# Patient Record
Sex: FEMALE | Race: WHITE
[De-identification: names, ages, dates, MRNs, and addresses within clinical notes are randomized per-mention and may not be internally consistent; named-entity substitution may affect disease eponyms.]

---

## 2017-01-05 ENCOUNTER — HOSPITAL ENCOUNTER (OUTPATIENT)
Dept: HOSPITAL 75 - REHAB | Age: 37
Discharge: HOME | End: 2017-01-05
Attending: PAIN MEDICINE
Payer: COMMERCIAL

## 2017-01-05 DIAGNOSIS — M25.551: Primary | ICD-10-CM

## 2017-02-09 ENCOUNTER — HOSPITAL ENCOUNTER (OUTPATIENT)
Dept: HOSPITAL 75 - RAD | Age: 37
End: 2017-02-09
Attending: FAMILY MEDICINE
Payer: COMMERCIAL

## 2017-02-09 DIAGNOSIS — N93.9: Primary | ICD-10-CM

## 2017-02-09 PROCEDURE — 76856 US EXAM PELVIC COMPLETE: CPT

## 2017-02-09 PROCEDURE — 76830 TRANSVAGINAL US NON-OB: CPT

## 2017-02-09 NOTE — DIAGNOSTIC IMAGING REPORT
EXAMINATION: Transabdominal and transvaginal pelvic ultrasound.



INDICATION: Abnormal uterine bleeding.



FINDINGS: The uterus is 10.2 x 8.7 x 11.5 cm. The posterior

aspect demonstrates an exophytic mass arising from the posterior

portion of the uterine body measuring 8 x 7.7 x 7.5 cm. There is

suggestion of a broad attachment over the uterus with no thin

pedicle seen.



The ovaries are obscured by bowel gas.



IMPRESSION: An 8 cm exophytic mass from the posterior aspect of

the uterine body suggestive of a subserosal fibroid.



Dictated by:



Dictated on workstation # [id number]

## 2017-03-10 ENCOUNTER — HOSPITAL ENCOUNTER (OUTPATIENT)
Dept: HOSPITAL 75 - PREOP | Age: 37
Discharge: HOME | End: 2017-03-10
Attending: HOSPITALIST
Payer: COMMERCIAL

## 2017-03-10 VITALS — DIASTOLIC BLOOD PRESSURE: 91 MMHG | SYSTOLIC BLOOD PRESSURE: 136 MMHG

## 2017-03-10 VITALS — BODY MASS INDEX: 47.09 KG/M2 | WEIGHT: 293 LBS | HEIGHT: 66 IN

## 2017-03-10 DIAGNOSIS — Z11.2: ICD-10-CM

## 2017-03-10 DIAGNOSIS — Z01.818: Primary | ICD-10-CM

## 2017-03-10 DIAGNOSIS — D52.9: ICD-10-CM

## 2017-03-10 DIAGNOSIS — E66.9: ICD-10-CM

## 2017-03-10 PROCEDURE — 87081 CULTURE SCREEN ONLY: CPT

## 2017-03-10 NOTE — XMS REPORT
Continuity of Care Document

 Created on: 03/10/2017



FADY GARCIA

External Reference #: [id number]

: 1980

Sex: Female



Demographics







 Address  [address] [place]  [address]

 

 Home Phone  [phone]

 

 Preferred Language  Unknown

 

 Marital Status  Unknown

 

 Faith Affiliation  Unknown

 

 Race  Unknown

 

 Ethnic Group  Unknown





Author







 Author  [facility]

 

 Organization  [facility]

 

 Address  Unknown

 

 Phone  Unavailable



                                                      



Allergies

                                                                               
         



Medications

                                                                               
         



Problems

                      





 Date Dx Coded                    Attending                    Type            
        Code                    Diagnosis                    Diagnosed By      
          

 

 1343                    WILLIAM LOPES MD                    Ot        
            M25.551                    PAIN IN RIGHT HIP                       
              

 

 2016                    ORENDDYLON DO FORTUNATO S                    Ot  
                  G47.33                    OBSTRUCTIVE SLEEP APNEA (ADULT) (
PEDIATR                                     

 

 2016                    ORENDER DO, FORTUNATO S                    Ot  
                  621.2                    HYPERTROPHY OF UTERUS               
                      

 

 2016                    ORENDER DO, FORTUNATO S                    Ot  
                  M25.551                    PAIN IN RIGHT HIP                 
                    

 

 2016                    ORENDER DO, FORTUNATO S                    Ot  
                  M54.5                    LOW BACK PAIN                       
              

 

 2016                    ORENDER DO, FORTUNATO S                    Ot  
                  M25.551                    PAIN IN RIGHT HIP                 
                    

 

 2016                    ORENDER DO, FORTUNATO S                    Ot  
                  M54.5                    LOW BACK PAIN                       
              

 

 2016                    ORENDER DO, FORTUNATO S                    Ot  
                  M25.551                    PAIN IN RIGHT HIP                 
                    

 

 2016                    ORENDER DO, FORTUNATO S                    Ot  
                  M54.5                    LOW BACK PAIN                       
              

 

 2016                    WILLIAM LOPES MD                    Ot        
            M53.3                    SACROCOCCYGEAL DISORDERS, NOT ELSEWHERE   
                                   

 

 2017                    WILLIAM LOPES MD                    Ot        
            M25.551                    PAIN IN RIGHT HIP                       
              

 

 2017                    CHIP DO, FORTUNATO S                    Ot  
                  N93.9                    ABNORMAL UTERINE AND VAGINAL BLEEDING
, U                                     

 

 2017                    MANUELITONDDYLON VALENCIA, FORTUNATO S                    Ot  
                  N93.9                    ABNORMAL UTERINE AND VAGINAL BLEEDING
, U                                     



                                                                               
                                                                               
                                                  



Procedures

                                                                               
         



Results

                                                                    



Encounters

                      





 ACCT No.                    Visit Date/Time                    Discharge      
              Status                    Pt. Type                    Provider   
                 Facility                    Loc./Unit                    
Complaint                

 

 [id number]                    2017 13:01:00                    2017 13:44:00                    DIS                    Outpatient             
       WILLIAM LOPES MD                    [facility]  
                  REHAB                    RIGHT HIP PAIN                

 

 [id number]                    2016 12:57:00                    2016 13:52:00                    DIS                    Outpatient             
       MOSES MCGUIRE, WILLIAM URIARTE                    [facility]  
                  CARD                    M53.3                

 

 [id number]                    2016 20:55:00                    2016 06:50:00                    DIS                    Outpatient             
       FORTUNATO SAUNDERS DO                    [facility]                    SLEEP                    NAS,SNORING,INSOMNIA     
           

 

 [id number]                    2014 11:54:00                    2014 23:59:59                    CLS                    Outpatient             
       FORTUNATO SAUNDERS DO                    [facility]                    RAD                    UTERINE ENLARGEMENT        
        

 

 [id number]                    2017 11:39:00                           
              ACT                    Outpatient                    FORTUNATO SAUNDERS DO                    [facility]               
     RAD                    AUB                

 

 [id number]                    2016 14:45:00                           
              ACT                    Outpatient                    FORTUNATO SAUNDERS DO                    [facility]               
     RAD                    R HIP PAIN,LOW BACK PAIN

## 2017-03-13 ENCOUNTER — HOSPITAL ENCOUNTER (OUTPATIENT)
Dept: HOSPITAL 75 - SDC | Age: 37
Discharge: HOME | End: 2017-03-13
Attending: HOSPITALIST
Payer: COMMERCIAL

## 2017-03-13 VITALS — DIASTOLIC BLOOD PRESSURE: 87 MMHG | SYSTOLIC BLOOD PRESSURE: 123 MMHG

## 2017-03-13 VITALS — WEIGHT: 293 LBS | HEIGHT: 66 IN | BODY MASS INDEX: 47.09 KG/M2

## 2017-03-13 VITALS — DIASTOLIC BLOOD PRESSURE: 86 MMHG | SYSTOLIC BLOOD PRESSURE: 126 MMHG

## 2017-03-13 VITALS — DIASTOLIC BLOOD PRESSURE: 67 MMHG | SYSTOLIC BLOOD PRESSURE: 124 MMHG

## 2017-03-13 VITALS — SYSTOLIC BLOOD PRESSURE: 132 MMHG | DIASTOLIC BLOOD PRESSURE: 77 MMHG

## 2017-03-13 DIAGNOSIS — E66.9: ICD-10-CM

## 2017-03-13 DIAGNOSIS — D25.9: Primary | ICD-10-CM

## 2017-03-13 PROCEDURE — 84703 CHORIONIC GONADOTROPIN ASSAY: CPT

## 2017-03-13 PROCEDURE — 88305 TISSUE EXAM BY PATHOLOGIST: CPT

## 2017-03-13 NOTE — DISCHARGE INST-WOMEN'S SERVICE
Discharge Inst-Women's Serv


Depart Medication/Instructions


New, Converted or Re-Newed RX:  RX on Chart


Final Diagnosis


Abnormal uterine bleeding, obesity, uterine fibroid





Consults/Follow Up


Additional Follow Up:  Yes


Orders/Referrals


Follow-up with Dr. Louise after 4/3/17





Activity


Activity:  Activity as Tolerated


Driving Instructions:  No Driving for 24 Hours (or while taking narcotic pain 

medications)


NO SMOKING:  NO SMOKING


Nothing Inside Vagina:  No Douching, No [place], No Tampons


Other Activity


Nothing per vagina for one week





Diet


Discharge Diet:  No Restrictions


Symptoms to Report to :  Bleeding Excessive, Pain Increased, Fever Over 101 

Degrees F, Pain/Pressure in Chest, Vaginal Bleeding Increase, Dizziness/Fainting

, Nausea/Vomiting, Shortness of Breath


For Any Problems or Questions:  Contact Your Physician, Go to Emergency Room








NENA LOUISE MD Mar 13, 2017 09:43

## 2017-03-13 NOTE — PROGRESS NOTE-STANDARD
Standard Progress Note


Progress Notes/Assess & Plan


Date Seen


3/13/17


Assess & Plan/Chief Complaint


The following H&P was written 3/10/17.


No changes are noted.


         


Gynecology Visit *


 


Patient:   Elina Griffith            MRN: [id number]            FIN: [id number]        

     


Age:   36 years     Sex:  Female     :  1980 


Associated Diagnoses:   None 


Author:   Aspen MCGUIRE, Akanksha 


 


Visit Information 


Visit type:  New patient evaluation.  


Accompanied by:  No one.  


Source of history:  Self.  


Referral source:  Self.  


History limitation:  None.  


 


Chief Complaint 


3/10/2017 9:52 AM CST    New GYN - here for abnormal periods and heavy bleeding 

- periods are heavy with abnormal intervals and irregular length of time - 

minimal amount of pain   


 


History of Present Illness 


37 y/o  s/p BTL here for irregular bleeding/fibroids


Reports that in 2016, she had her "normal" cycle (monthly, lasting 3-5 

days, not heavy).  She then had no period in Sept.  In October, she had more of 

a "normal" 5 day period.  In November, no cycle.  In December, she started on  and bled until .  She reports that she had several days with soaking 

through pads in less than one hour and large clots.


She reports seeing Dr. Koch for this and having an ultrasound and then being 

told to see a gynecologist.


She does have migraine with "stroke-like"symptoms (started after her third 

delivery.


Reports 25lb wt gain since last .  States she has gained 10-20lb per year 

for the last several years but this has not affected her cycles before that she 

is aware of.


Has recently been diagnosed with NAS.


Does have hip pain, has seen Dr. Driscoll for this.


Takes no medications.





OBHx:   CD x 4 (first for chorio/arrest of labor), babies 8-9lb


GYNHx:  Menses irregular as above since 2016.  No STIs.  2 lifetime 

partners.  No abnormal pap smears, last 3-4 years ago. 


 


Review of Systems 


 


 


Reviewed intake form from 3/10/17 


 


Health Status 


Allergies:  


Allergic Reactions (Selected)


No Known Medication Allergies 


Problem list:  


All Problems (Selected)


Seasonal allergies / SNOMED CT [id number] / Confirmed


Morbid obesity / SNOMED CT 070648429 / Probable


Sacrococcygeal disorders, not elsewhere classified / SNOMED CT 4427319689 / 

Confirmed


Anemia / SNOMED CT N87248K0-O6NR-74V5-R259-00636FQ664N1 / Confirmed 


 


Histories 


Past Medical History:  


Active


Seasonal allergies ([id number])


Anemia (N48674C1-J3ND-28L5-T459-87063FY466G5) 


Family History:  


Lupus


Mother


High blood pressure


Father


Cancer


Father


Grandmother (P)


Grandfather (P)


Seasonal Allergies


Father


Sister


Brother


 


Procedure history:  


Injection of sacroiliac joint using fluoroscopic guidance (5000344833) on 2016 at 36 Years.


CS -  section (9072169063) on 2009 at 28 Years.


Tubal ligation (979949748) on 2009 at 28 Years.


CS -  section (5967523368) on 2006 at 26 Years.


CS -  section (8806891557) on 2005 at 24 Years.


CS -  section (3344446111) on 2002 at 21 Years. 


Social History:  


      Alcohol Assessment


            1-2 times per month


      Tobacco Assessment


            Never smoker


      Substance Abuse Assessment: Denies Substance Abuse


      Sexual Assessment


            Sexually active: Yes.  Number of lifetime partners: 2.  Sexual 

orientation: Heterosexual.


 


 


Physical Examination 


Last Menstrual Period: 2016   


Vital Signs 











3/10/2017 9:52 AM CST Systolic Blood Pressure 132 mmHg


 


 Diastolic Blood Pressure 80 mmHg


 


 Mean Arterial Pressure 97 mmHg








Measurements from flowsheet : Measurements 











3/10/2017 9:52 AM CST Height Measured - Standard 66 in


 


 Weight Measured - Standard 385 lb


 


 BSA 2.85 m2


 


 Body Mass Index 62.13 kg/m2








General:  Alert and oriented, No acute distress.  


Gynecology:   


     Labia: Within normal limits.  


     Vagina: Within normal limits, No bleeding.  


     Cervix: not able to visualize despite multiple attempts/different 

speculums.  


     Uterus: enlarged but limited exam.  


Psychiatric:  Cooperative, Appropriate mood & affect.  


 


Review / Management 


Radiology results 


sono 17


uterus 10 x 9 x 12 cm


posterior aspect - exophytic mass (likely subserosal fibroid per read) 8 x 8 x 

8 cm


ovaries not visualized 


 


Impression and Plan 


Plan:  37 y/o  here for heavy menstrual bleeding/AUB.


Uterine fibroid


Class III obesity (BMI 62)


H/o migraine with aura


S/p BTL





I reviewed with Elina that her bleeding may be related to the fibroid but we 

need to r/o other causes.  Will obtain TSH, HCG, PRL, CBC.  I do believe her 

obesity plays a role in the abnormal bleeding and we discussed this.  She 

reports trying dietary changes and I encouraged her to discuss other therapies 

with Dr. Koch.  





I was unable to visualize the cervix today.  I discussed that we will need to 

proceed with hysteroscopy, D&C to evaluate the endometrium and r/o hyperplasia/

malignancy.





Will discuss further therapy once we have all the results..  


Orders   


Orders 


Charges (Evaluation and Management):


84664 office outpatient new 30 minutes (Charge) (Order): Quantity: 1, Other 

specified abnormal uterine and vaginal bleeding | Obesity due to excess 

calories | Migraine with aura.   





Signature Line








Signed and Authored by Akanksha Louise MD on 03/10/2017 11:16 AM CST





Charted Date:   March 10, 2017 11:05 AM CST


Subject / Title:   Gynecology Visit *


Performed By:   Akanksha Louise MD on March 10, 2017 11:16 AM CST


Electronically Signed By:   Akanksah Louise MD on March 10, 2017 11:16 AM CST


Visit Information:   [id number], [facility], Outpatient, 3/10/2017 

- 3/12/2017








AKANKSHA LOUISE MD Mar 13, 2017 09:31

## 2017-03-13 NOTE — OB/GYN OPERATIVE REPORT
Operative Report


Date of Procedure: March 13, 2017





Preoperative Diagnosis: Abnormal uterine bleeding, class III obesity, uterine 

fibroids





Postoperative Diagnosis: Same





Procedure: Attempted hysteroscopy, dilatation and curettage





Surgeon: Nena Louise MD





Anesthesia: General





Estimated Blood Loss:  Minimal





Specimens:  Endometrial curettings to pathology





Indications for Procedure: This is a 37 y/o female with abnormal uterine 

bleeding, class III obesity (BMI 62) and uterine fibroid. Endometrial biopsy 

was attempted in the office however not possible due habitus and patient 

tolerance. She was counseled on proceeding to the OR for definitive diagnosis.





Findings:  Uterus sounded to 10cm.  Unable to complete hysteroscopy due to 

location of the cervix/position of the uterus.  





Procedure: The patient was taken to the operating room where sequential 

compression devices were placed on the bilateral lower extremities. Intravenous 

fluids were running. General anesthesia was obtained without difficulty. She 

was repositioned in the dorsal lithotomy position with the use of Yellofin 

stirrups. She was prepped and draped in the typical sterile fashion.





The bladder was emptied with a straight catheterization yielding 400 cc of 

clear yellow urine. A weighted speculum was placed in the vagina. A right angle 

was utilized to elevate the vaginal tissue anteriorly. A single tooth tenaculum 

was placed on the anterior lip of the cervix with moderate difficulty due to 

the location of the cerivx. The uterus was sounded to 10cm, again with 

difficulty. Delatorre dilators were used to dilate the cervix to 17mm. The Truclear 

5 mm hysteroscope was attempted to be advanced into the endometrial cavity.  

However, despite multiple attempts at dilating and repositioning the cervix 

with a single tooth tenaculum on the posterior lip of the cervix, I could not 

advance the hysteroscope into the cavity.  It was felt that the chance of 

perforation was quite high, and my first goal was to obtain a tissue sample for 

pathology.  Thus, after approximately 15-20 minutes of attempting to enter the 

cavity with the hysteroscope, I stopped that portion of the procedure.  

Unfortunately there is no flexible hysteroscope available.  I was able to 

advance a small sized curette into the cavity. A sharp circumferential 

curettage was performed with a small sharp curette; this was sent to pathology. 

All instruments were removed from the vagina; the anterior and posterior lips 

of the cervix were hemostatic after tenaculum removal.





The patient tolerated the procedure well. Instrument counts were correct. The 

patient was taken to recovery in stable condition.





Complications: None 





Disposition: Home, stable








NENA LOUISE MD Mar 13, 2017 09:45

## 2017-03-13 NOTE — XMS REPORT
Continuity of Care Document

 Created on: 03/10/2017



FADY GRIFFITH

External Reference #: [id number]

: 1980

Sex: Female



Demographics







 Address  [address] [place]  [address]

 

 Home Phone  [phone]

 

 Preferred Language  English

 

 Marital Status  Unknown

 

 Jainism Affiliation  Unknown

 

 Race  Unknown

 

 Ethnic Group  Unknown





Author







 Author  [facility]

 

 Organization  [facility]

 

 Address  Unknown

 

 Phone  Unavailable







Support







 Name  Relationship  Address  Phone

 

 NENA COLLINS MD  Caregiver  [address]

 [address] [place]  [address] [phone]

 

 FORTUNATO SAUNDERS DO  Caregiver  [address]

[place]  [address] [phone]

 

 SADIE GRIFFITH  Next Of Kin  [address] [place]  [address] [phone]







Care Team Providers







 Care Team Member Name  Role  Phone

 

 FORTUNATO SAUNDERS DO  PCP  [phone]







Insurance Providers







 Payer Name  Policy Number  Subscriber Name  Relationship

 

 [facility]  [id number]  Sadie Griffith  01 







Advance Directives







 Directive  Response  Recorded Date/Time

 

 Advance Directives  No  03/10/17 11:14am

 

 Health Care Power of   No  03/10/17 11:14am

 

 Organ Donor  No  03/10/17 11:14am

 

 Resuscitation Status  Full Code  03/10/17 11:14am







Problems

No problem information available.



Medications

No known medications.



Social History







 Social History Problem  Response  Recorded Date/Time

 

 Alcohol Use  Occasionally Uses  03/10/2017 11:14am

 

 Recreational Drug Use  No  03/10/2017 11:14am

 

 Recent Foreign Travel  No  03/10/2017 11:11am

 

 Recent Infectious Disease Exposure  No  03/10/2017 11:11am

 

 Smoking Status  Never a Smoker  03/10/2017 11:14am

 

 Recent Hopitalizations  No  03/10/2017 11:14am









 Query  Response  Start Date  Stop Date

 

 Smoking Status  Never a Smoker      







Hospital Discharge Instructions

No hospital discharge instructions.



Plan of Care







 Discharge Date  03/10/17 11:22am

 

 Prescriptions  See Medication Section







Functional Status

No functional status results.



Allergies, Adverse Reactions, Alerts

No known allergies.



Immunizations

No immunization records.



Vital Signs

Acute Vital Signs





 Vital  Response  Date/Time

 

 Pulse Rate (adult)  75 bpm (60 - 90)  03/10/2017 11:14am

 

 O2 Sat by Pulse Oximetry  97 % (88 - 100)  03/10/2017 11:14am

 

 Blood Pressure  136/91 mm Hg  03/10/2017 11:14am

 

    Blood Pressure Mean  106 mm Hg  03/10/2017 11:14am

 

 Pain      

 

    Numeric Pain Scale  0-No Pain  03/10/2017 11:14am

 

 Height (Feet)  5 feet  03/10/2017 11:11am

 

 Height (Inches)  6.00 inches  03/10/2017 11:11am

 

 Height (Calculated Centimeters)  167.026198 cm  03/10/2017 11:11am

 

 Weight (Pounds)  382 pounds  03/10/2017 11:11am

 

 Weight (Ounces)  0.0 oz  03/10/2017 11:11am

 

 Weight (Calculated Grams)  857651.29 gm  03/10/2017 11:11am

 

 Weight (Calculated Kilograms)  173.506317 kilograms  03/10/2017 11:11am

 

 Calculated BMI  61.7  03/10/2017 11:11am







Results

No known relevant diagnostic tests, laboratory data and/or discharge summary.



Procedures

No known history of procedures.



Encounters







 Encounter  Location  Arrival/Admit Date  Discharge/Depart Date  Attending 
Provider

 

 Departed Clinic  [facility]  03/10/17 11:04am  03/10/17 11:
22am  NENA COLLINS MD

 

 Registered Clinic  [facility]  17 11:39am     FORTUNATO SAUNDERS DO

## 2017-03-13 NOTE — XMS REPORT
Continuity of Care Document

 Created on: 03/10/2017



FADY GRIFFITH

External Reference #: [id number]

: 1980

Sex: Female



Demographics







 Address  [address] [place]  [address]

 

 Home Phone  [phone]

 

 Preferred Language  English

 

 Marital Status  Unknown

 

 Druze Affiliation  Unknown

 

 Race  Unknown

 

 Ethnic Group  Unknown





Author







 Author  [facility]

 

 Organization  [facility]

 

 Address  Unknown

 

 Phone  Unavailable







Support







 Name  Relationship  Address  Phone

 

 NENA COLLINS MD  Caregiver  [address]

 [address] [place]  [address] [phone]

 

 FORTUNATO SAUNDERS DO  Caregiver  [address]

[place]  [address] [phone]

 

 SADIE GRIFFITH  Next Of Kin  [address] [place]  [address] [phone]







Care Team Providers







 Care Team Member Name  Role  Phone

 

 FORTUNATO SAUNDERS DO  PCP  [phone]







Insurance Providers







 Payer Name  Policy Number  Subscriber Name  Relationship

 

 [facility]  [id number]  Sadie Griffith  01 







Advance Directives







 Directive  Response  Recorded Date/Time

 

 Advance Directives  No  03/10/17 11:14am

 

 Health Care Power of   No  03/10/17 11:14am

 

 Organ Donor  No  03/10/17 11:14am

 

 Resuscitation Status  Full Code  03/10/17 11:14am







Problems

No problem information available.



Medications

No known medications.



Social History







 Social History Problem  Response  Recorded Date/Time

 

 Alcohol Use  Occasionally Uses  03/10/2017 11:14am

 

 Recreational Drug Use  No  03/10/2017 11:14am

 

 Recent Foreign Travel  No  03/10/2017 11:11am

 

 Recent Infectious Disease Exposure  No  03/10/2017 11:11am

 

 Smoking Status  Never a Smoker  03/10/2017 11:14am

 

 Recent Hopitalizations  No  03/10/2017 11:14am









 Query  Response  Start Date  Stop Date

 

 Smoking Status  Never a Smoker      







Hospital Discharge Instructions

No hospital discharge instructions.



Plan of Care







 Discharge Date  03/10/17 11:22am

 

 Prescriptions  See Medication Section







Functional Status

No functional status results.



Allergies, Adverse Reactions, Alerts

No known allergies.



Immunizations

No immunization records.



Vital Signs

Acute Vital Signs





 Vital  Response  Date/Time

 

 Pulse Rate (adult)  75 bpm (60 - 90)  03/10/2017 11:14am

 

 O2 Sat by Pulse Oximetry  97 % (88 - 100)  03/10/2017 11:14am

 

 Blood Pressure  136/91 mm Hg  03/10/2017 11:14am

 

    Blood Pressure Mean  106 mm Hg  03/10/2017 11:14am

 

 Pain      

 

    Numeric Pain Scale  0-No Pain  03/10/2017 11:14am

 

 Height (Feet)  5 feet  03/10/2017 11:11am

 

 Height (Inches)  6.00 inches  03/10/2017 11:11am

 

 Height (Calculated Centimeters)  167.840931 cm  03/10/2017 11:11am

 

 Weight (Pounds)  382 pounds  03/10/2017 11:11am

 

 Weight (Ounces)  0.0 oz  03/10/2017 11:11am

 

 Weight (Calculated Grams)  527457.29 gm  03/10/2017 11:11am

 

 Weight (Calculated Kilograms)  173.530484 kilograms  03/10/2017 11:11am

 

 Calculated BMI  61.7  03/10/2017 11:11am







Results

No known relevant diagnostic tests, laboratory data and/or discharge summary.



Procedures

No known history of procedures.



Encounters







 Encounter  Location  Arrival/Admit Date  Discharge/Depart Date  Attending 
Provider

 

 Departed Clinic  [facility]  03/10/17 11:04am  03/10/17 11:
22am  NENA COLLINS MD

 

 Registered Clinic  [facility]  17 11:39am     FORTUNATO SAUNDERS DO

## 2017-03-31 ENCOUNTER — HOSPITAL ENCOUNTER (OUTPATIENT)
Dept: HOSPITAL 75 - CARD | Age: 37
Discharge: HOME | End: 2017-03-31
Attending: PAIN MEDICINE
Payer: COMMERCIAL

## 2017-03-31 VITALS — DIASTOLIC BLOOD PRESSURE: 96 MMHG | SYSTOLIC BLOOD PRESSURE: 162 MMHG

## 2017-03-31 VITALS — SYSTOLIC BLOOD PRESSURE: 146 MMHG | DIASTOLIC BLOOD PRESSURE: 93 MMHG

## 2017-03-31 VITALS — BODY MASS INDEX: 47.09 KG/M2 | HEIGHT: 66 IN | WEIGHT: 293 LBS

## 2017-03-31 DIAGNOSIS — M53.3: Primary | ICD-10-CM

## 2017-03-31 PROCEDURE — 27096 INJECT SACROILIAC JOINT: CPT

## 2017-04-08 ENCOUNTER — HOSPITAL ENCOUNTER (EMERGENCY)
Dept: HOSPITAL 75 - ER | Age: 37
Discharge: HOME | End: 2017-04-08
Payer: COMMERCIAL

## 2017-04-08 VITALS — SYSTOLIC BLOOD PRESSURE: 146 MMHG | DIASTOLIC BLOOD PRESSURE: 98 MMHG

## 2017-04-08 VITALS — BODY MASS INDEX: 47.09 KG/M2 | HEIGHT: 66 IN | WEIGHT: 293 LBS

## 2017-04-08 DIAGNOSIS — N39.0: Primary | ICD-10-CM

## 2017-04-08 LAB
BILIRUB UR QL STRIP: (no result)
KETONES UR QL STRIP: NEGATIVE
LEUKOCYTE ESTERASE UR QL STRIP: NEGATIVE
NITRITE UR QL STRIP: POSITIVE
PH UR STRIP: 5 [PH] (ref 5–9)
PROT UR QL STRIP: (no result)
SP GR UR STRIP: 1.02 (ref 1.02–1.02)
SQUAMOUS #/AREA URNS HPF: (no result) /HPF
UROBILINOGEN UR-MCNC: 8 MG/DL
WBC #/AREA URNS HPF: (no result) /HPF

## 2017-04-08 PROCEDURE — 87088 URINE BACTERIA CULTURE: CPT

## 2017-04-08 PROCEDURE — 96372 THER/PROPH/DIAG INJ SC/IM: CPT

## 2017-04-08 PROCEDURE — 81000 URINALYSIS NONAUTO W/SCOPE: CPT

## 2017-04-08 PROCEDURE — 99282 EMERGENCY DEPT VISIT SF MDM: CPT

## 2017-04-08 PROCEDURE — 84703 CHORIONIC GONADOTROPIN ASSAY: CPT

## 2017-04-08 NOTE — ED GU-FEMALE
General


Chief Complaint:  -Female


Stated Complaint:  UTI


Nursing Triage Note:  


PT CO OF UTI SX, HAS BEEN ON ANITBIOTIC SINCE THURSDAY, CIPRO, AZO. PT STATES 


HAS CONT SX, PAIN , BURNING AND FREQUENCY AND L FLANK PAIN


Nursing Sepsis Screen:  No Definite Risk


Source:  patient


Exam Limitations:  no limitations





History of Present Illness


Time seen by provider:  11:16


Initial Comments


36-year-old female patient presents to the emergency department complaining of 

dysuria, frequency, left flank pain.  Reports pain radiates into the left back.

  Patient states she was started on ciprofloxacin and Azo on Thursday, but 

states symptoms have progressively gotten worse.


Timing/Duration:  other (Wednesday)


Severity/Quality:  burning


Location:  suprapubic


Radiation:  back (lt), left flank, urethral


Activities at Onset:  none


Prior Genitourinary Problems:  similar symptoms


Sexual [place] History:  less than 2 months ago, single partner


Modifying Factors:  Worsens With Palpation, Worsens With Urinating





Allergies and Home Medications


Allergies


Coded Allergies:  


     No Known Drug Allergies (Unverified , 3/10/17)





Home Medications


Cefdinir 300 Mg Capsule, 300 MG PO BID, #14 Ref 0


   Prescribed by: ROSE DIETRICH on 17 1150


Hydrocodone/Acetaminophen 1 Each Tablet, 1 EACH PO Q4H PRN for PAIN, #10


   Prescribed by: NENA COLLINS on 3/13/17 0944


Hydrocodone/Acetaminophen 1 Each Tablet, 1 EACH PO Q4H PRN for PAIN, #14 Ref 0


   Prescribed by: ROSE DIETRICH on 17 1150


Ibuprofen 600 Mg Tablet, 600 MG PO Q6H, #30


   Prescribed by: NENA COLLINS on 3/13/17 0944





Constitutional:  No chills, No fever, malaise


Respiratory:  no symptoms reported


Cardiovascular:  no symptoms reported


Gastrointestinal:  abdominal pain, No constipation, No diarrhea, No nausea, No 

vomiting


Genitourinary:  see HPI, burning, denies discharge, dysuria, frequency, flank 

pain, denies hematuria, pain, urgency


Musculoskeletal:  see HPI, back pain


Skin:  no symptoms reported


Psychiatric/Neurological:  No Symptoms Reported


All Other Systemes Reviewed


Negative Unless Noted:  Yes (Negative excepted noted.)





Past Medical-Social-Family Hx


Patient Social History


Alcohol Use:  Denies Use


Recreational Drug Use:  No


Smoking Status:  Never a Smoker


Recent Foreign Travel:  No


Contact w/Someone Who Travel:  No


Recent Infectious Disease Expo:  No


Recent Hopitalizations:  No





Immunizations Up To Date


Tetanus Booster (TDap):  Unknown





Seasonal Allergies


Seasonal Allergies:  Yes





Surgeries


HX Surgeries:  Yes (C/S x4, WISDOM TEETH)


Surgeries:   Section





Respiratory


Hx Respiratory Disorders:  Yes


Respiratory Disorders:  Sleep Apnea





Cardiovascular


Hx Cardiac Disorders:  No





Neurological


Hx Neurological Disorders:  Yes


Neurological Disorders:  Headaches /Migraines





Reproductive System


Hx Reproductive Disorders:  Yes (AUB)





Genitourinary


Hx Genitourinary Disorders:  No





Gastrointestinal


Hx Gastrointestinal Disorders:  No





Musculoskeletal


Hx Musculoskeletal Disorders:  Yes (HIP JOINT PAIN)





Endocrine


Hx Endocrine Disorders:  No





HEENT


HX ENT Disorders:  No


Loss of Vision:  Denies


Hearing Impairment:  Denies





Cancer


Hx Cancer:  No





Psychosocial


Hx Psychiatric Problems:  No





Integumentary


HX Skin/Integumentary Disorder:  No





Blood Transfusions


Hx Blood Disorders:  Yes (MILD ANEMIA)





Reviewed Nursing Assessment


Reviewed/Agree w Nursing PMH:  Yes





Family Medical History


Significant Family History:  No Pertinent Family Hx





Physical Exam


Vital Signs





Vital Sign - Last 12Hours








 17





 10:20


 


Temp 97.3


 


Pulse 77


 


Resp 18


 


B/P (MAP) 152/104


 


Pulse Ox 93





Capillary Refill : Less Than 3 Seconds


General Appearance:  WD/WN, no apparent distress


Cardiovascular:  regular rate, rhythm, no murmur


Respiratory:  lungs clear, normal breath sounds, no respiratory distress


Gastrointestinal:  normal bowel sounds, soft, No distended, guarding (suprapubic

, LLQ, and left flank), No rebound, tenderness (generalized abdominal and 

bilateral flank tenderness.)


Back:  normal inspection, No CVA tenderness (R), CVA tenderness (L)


Extremities:  normal inspection, normal capillary refill


Neurologic/Psychiatric:  alert, normal mood/affect, oriented x 3


Skin:  normal color, warm/dry





Progress/Results/Core Measures


Results/Orders


Lab Results





Laboratory Tests








Test


  17


10:40 Range/Units


 


 


Urine Color ANGELA H  


 


Urine Clarity


  SLIGHTLY


CLOUDY  


 


 


Urine pH 5  5-9  


 


Urine Specific Gravity 1.020  1.016-1.022  


 


Urine Protein 2+ H NEGATIVE  


 


Urine Glucose (UA) NEGATIVE  NEGATIVE  


 


Urine Ketones NEGATIVE  NEGATIVE  


 


Urine Nitrite POSITIVE H NEGATIVE  


 


Urine Bilirubin 3+ H NEGATIVE  


 


Urine Urobilinogen 8 H NORMAL  MG/DL


 


Urine Leukocyte Esterase NEGATIVE  NEGATIVE  


 


Urine RBC (Auto) 3+ H NEGATIVE  


 


Urine RBC NONE   /HPF


 


Urine WBC 0-2   /HPF


 


Urine Squamous Epithelial


Cells 2-5 


   /HPF


 


 


Urine Crystals NONE   /LPF


 


Urine Bacteria FEW H  /HPF


 


Urine Casts NONE   /LPF


 


Urine Mucus NEGATIVE   /LPF


 


Urine Culture Indicated NO   








My Orders





Orders - ROSE DIETRICH


Ceftriaxone Injection (Rocephin Injectio (17 11:30)


Lidocaine 1% Injection (Xylocaine 1% Inj (17 11:30)





Medications Given in ED





Current Medications








 Medications  Dose


 Ordered  Sig/Vernell


 Route  Start Time


 Stop Time Status Last Admin


Dose Admin


 


 Lidocaine HCl  2.1 ml  ONCE  ONCE


 INJ  17 11:30


 17 11:31 DC 17 11:43


2.1 ML








Vital Signs/I&O





Vital Sign - Last 12Hours








 17





 10:20


 


Temp 97.3


 


Pulse 77


 


Resp 18


 


B/P (MAP) 152/104


 


Pulse Ox 93














Blood Pressure Mean:  120











Point of Care Testing


Urine Pregnancy-Bedside:  Negative





Departure


Communication


Progress Notes


Patient is noted to be nitrite positive in light of having antibiotics for the 

last 2 days.  Unable to obtain outpatient culture results at this time.  Will 

give patient 1 g Rocephin IM as well as changed from ciprofloxacin to Omnicef.  

Discharge to home.





Impression


Impression:  


 Primary Impression:  


 Urinary tract infection


 Qualified Codes:  N30.00 - Acute cystitis without hematuria


Disposition:   HOME, SELF-CARE


Condition:  Improved





Departure-Patient Inst.


Decision time for Depature:  11:49


Referrals:  


FORTUNATO SAUNDERS DO (PCP/Family)


Primary Care Physician


Patient Instructions:  Urinary Tract Infection, Adult (DC)





Add. Discharge Instructions:  


All discharge instructions reviewed with patient and/or family. Voiced 

understanding.  Medications as instructed.  Continue Azo as needed for 

symptoms.  Ibuprofen 800 mg by mouth every 8 hours as needed for pain.  Drink 

plenty of fluids.  Follow-up with Dr. Saunders as an outpatient for recheck.  

Return to the emergency department for worsened symptoms or any other concerns.


Scripts


Hydrocodone/Acetaminophen (Hydrocodon -Acetaminophen 5-325) 1 Each Tablet


1 EACH PO Q4H Y for PAIN, #14 TAB 0 Refills


   Prov: ROSE DIETRICH         17 


Cefdinir (Cefdinir) 300 Mg Capsule


300 MG PO BID, #14 CAP 0 Refills


   Prov: ROSE DIETRICH         17











ROSE DIETRICH 2017 11:16

## 2017-05-14 NOTE — XMS REPORT
Continuity of Care Document

 Created on: 2017



RIKKI GARCIA

External Reference #: [id number]

: 1980

Sex: Female



Demographics







 Address  [address] [place]  [address]

 

 Home Phone  [phone]

 

 Preferred Language  Unknown

 

 Marital Status  Unknown

 

 Gnosticist Affiliation  Unknown

 

 Race  Unknown

 

 Ethnic Group  Unknown





Author







 Author  [facility]

 

 Organization  [facility]

 

 Address  Unknown

 

 Phone  Unavailable



                                                      



Allergies

                      





 Active                    Description                    Code                  
  Type                    Severity                    Reaction                  
  Onset                    Reported/Identified                    Relationship 
to Patient                    Clinical Status                

 

 Yes                    No Known Drug Allergies                    [id number]  
                  Drug Allergy                    Unknown                    N/
A                                         03/10/2017                           
                               



                                                                               
         



Medications

                                                                               
         



Problems

                      





 Date Dx Coded                    Attending                    Type            
        Code                    Diagnosis                    Diagnosed By      
          

 

 1343                    WILLIAM LOPES MD                    Ot        
            M25.551                    PAIN IN RIGHT HIP                       
              

 

 2016                    MANUELITONDER DO, FORTUNATO S                    Ot  
                  G47.33                    OBSTRUCTIVE SLEEP APNEA (ADULT) (
PEDIATR                                     

 

 2016                    MANUELITONDDYLON DO, FORTUNATO S                    Ot  
                  621.2                    HYPERTROPHY OF UTERUS               
                      

 

 2016                    ORENDER DO, FORTUNATO S                    Ot  
                  M25.551                    PAIN IN RIGHT HIP                 
                    

 

 2016                    ORENDER DO, FORTUNATO S                    Ot  
                  M54.5                    LOW BACK PAIN                       
              

 

 2016                    ORENDER DO, FORTUNATO S                    Ot  
                  M25.551                    PAIN IN RIGHT HIP                 
                    

 

 2016                    ORENDER DO, FORTUNATO S                    Ot  
                  M54.5                    LOW BACK PAIN                       
              

 

 2016                    ORENDER DO, FORTUNATO S                    Ot  
                  M25.551                    PAIN IN RIGHT HIP                 
                    

 

 2016                    ORENDER DO, FORTUNATO S                    Ot  
                  M54.5                    LOW BACK PAIN                       
              

 

 2016                    WILLIAM LOPES MD                    Ot        
            M53.3                    SACROCOCCYGEAL DISORDERS, NOT ELSEWHERE   
                                   

 

 2017                    WILLIAM LOPES MD                    Ot        
            M25.551                    PAIN IN RIGHT HIP                       
              

 

 2017                    CHIP VALENCIA, FORTUNATO S                    Ot  
                  N93.9                    ABNORMAL UTERINE AND VAGINAL BLEEDING
, U                                     

 

 2017                    MILI SAUNDERS DOQUELINE S                    Ot  
                  N93.9                    ABNORMAL UTERINE AND VAGINAL BLEEDING
, U                                     

 

 2017                    DENNIS MCGUIRE, NENA BRANDT                    Ot        
            D25.9                    LEIOMYOMA OF UTERUS, UNSPECIFIED          
                           

 

 2017                    DENNIS MCGUIRE, NENA BRANDT                    Ot        
            E66.9                    OBESITY, UNSPECIFIED                      
               

 

 2017                    NENA COLLINS MD                    Ot        
            D25.9                    LEIOMYOMA OF UTERUS, UNSPECIFIED          
                           

 

 2017                    NENA COLLINS MD                    Ot        
            E66.9                    OBESITY, UNSPECIFIED                      
               

 

 2017                    WILLIAM LOPES MD                    Ot        
            M53.3                    SACROCOCCYGEAL DISORDERS, NOT ELSEWHERE   
                                   

 

 2017                    WILLIAM LOPES MD                    Ot        
            M53.3                    SACROCOCCYGEAL DISORDERS, NOT ELSEWHERE   
                                   

 

 2017                    ROSE MONTIEL                    Ot     
               N39.0                    URINARY TRACT INFECTION, SITE NOT 
SPECIF                                     

 

 2017                    TRACIE MONTIELEN REGINA                    Ot     
               R30.0                    DYSURIA                                
     

 

 04/10/2017                    ROSE MONTIEL                    Ot     
               N39.0                    URINARY TRACT INFECTION, SITE NOT 
SPECIF                                     

 

 04/10/2017                    ROSE MONTIEL                    Ot     
               R30.0                    DYSURIA                                
     



                                                                               
                                                                               
                                                                               
                                                                       



Procedures

                                                                               
         



Results

                      





 Test                    Result                    Range                









 Methicillin resistant Staphylococcus aureus (MRSA) screening culture - 03/10/
17 11:21                









 Methicillin resistant Staphylococcus aureus (MRSA) screening culture          
          NEG                     NRG                









 Urine beta human chorionic gonadotropin (hCG) measurement - 17 07:25    
            









 Urine beta human chorionic gonadotropin (hCG) measurement                    
NEGATIVE                     NEGATIVE                









 Complete urinalysis with reflex to culture - 17 10:40                









 Urine color determination                    ANGELA                     NRG    
            

 

 Urine clarity determination                    SLIGHTLY CLOUDY                
     NRG                

 

 Urine pH measurement by test strip                    5                     5-
9                

 

 Specific gravity of urine by test strip                    1.020              
       1.016-1.022                

 

 Urine protein assay by test strip, semi-quantitative                    2+    
                 NEGATIVE                

 

 Urine glucose detection by automated test strip                    NEGATIVE   
                  NEGATIVE                

 

 Erythrocytes detection in urine sediment by light microscopy                  
  3+                     NEGATIVE                

 

 Urine ketones detection by automated test strip                    NEGATIVE   
                  NEGATIVE                

 

 Urine nitrite detection by test strip                    POSITIVE             
        NEGATIVE                

 

 Urine total bilirubin detection by test strip                    3+           
          NEGATIVE                

 

 Urine urobilinogen measurement by automated test strip (mass/volume)          
          8 mg/dL                    NORMAL                

 

 Urine leukocyte esterase detection by dipstick                    NEGATIVE    
                 NEGATIVE                

 

 Automated urine sediment erythrocyte count by microscopy (number/high power 
field)                    NONE                     NRG                

 

 Automated urine sediment leukocyte count by microscopy (number/high power field
)                     [HPF]                    NRG                

 

 Bacteria detection in urine sediment by light microscopy                    
FEW                     NRG                

 

 Squamous epithelial cells detection in urine sediment by light microscopy     
               2-5                     NRG                

 

 Crystals detection in urine sediment by light microscopy                    
NONE                     NRG                

 

 Casts detection in urine sediment by light microscopy                    NONE 
                    NRG                

 

 Mucus detection in urine sediment by light microscopy                    
NEGATIVE                     NRG                

 

 Complete urinalysis with reflex to culture                    NO              
       NRG                









 Bacterial urine culture - 17 10:40                









 Bacterial urine culture                    [id number]                     NRG   
             

 

 COLONY COUNT                    10,000/ML - 100,000/ML                     NRG
                

 

 FTX;REPORTABLE                    UNABLE TO ID MUCOID GRAM NEGATIVE ANDRES       
              NRG                

 

 URINE CULTURE RESULTS                    PLUS                     NRG         
       

 

 FREE TEXT ENTRY 2                    PLEASE NOTIFY MICRO AT X 141 IF  WANTS 
                    NRG                

 

 FREE TEXT ENTRY 3                    THIS ISOLATE SENT TO A REFERENCE LAB.    
                 NRG                



                                                                               
                             



Encounters

                      





 ACCT No.                    Visit Date/Time                    Discharge      
              Status                    Pt. Type                    Provider   
                 Facility                    Loc./Unit                    
Complaint                

 

 [id number]                    2017 10:00:00                    2017 12:10:00                    DIS                    Emergency              
      ROSE MONTIEL                    [facility]
                    ER                    UTI                

 

 [id number]                    2017 10:56:00                    2017 12:09:00                    DIS                    Outpatient             
       WILLIAM LOPES MD                    [facility]  
                  CARD                    SACROCOCCYGEAL DISORDER              
  

 

 [id number]                    2017 08:10:00                    2017 13:39:00                    DIS                    Outpatient             
       NENA COLLINS MD                    [facility]                    AUB;OBESITY,FIBROIDS                

 

 [id number]                    03/10/2017 11:04:00                    03/10/
2017 11:22:00                    DIS                    Outpatient             
       NENA COLLINS MD                    [facility]  
                  PREOP                    AUB;OBESITY,FIBROIDS                

 

 [id number]                    2017 13:01:00                    2017 13:44:00                    DIS                    Outpatient             
       WILLIAM LOPES MD                    [facility]  
                  REHAB                    RIGHT HIP PAIN                

 

 [id number]                    2016 12:57:00                    2016 13:52:00                    DIS                    Outpatient             
       WILLIAM LOPES MD                    [facility]  
                  CARD                    M53.3                

 

 [id number]                    2016 20:55:00                    2016 06:50:00                    DIS                    Outpatient             
       FORTUNATO SAUNDERS DO                    [facility]                    SLEEP                    NAS,SNORING,INSOMNIA     
           

 

 [id number]                    2014 11:54:00                    2014 23:59:59                    CLS                    Outpatient             
       FORTUNATO SAUNDERS DO                    [facility]                    RAD                    UTERINE ENLARGEMENT        
        

 

 [id number]                    2017 11:39:00                           
              ACT                    Outpatient                    FORTUNATO SAUNDERS DO                    [facility]               
     RAD                    AUB                

 

 [id number]                    2016 14:45:00                           
              ACT                    Outpatient                    FORTUNATO SAUNDERS DO                    [facility]               
     RAD                    R HIP PAIN,LOW BACK PAIN

## 2017-05-28 ENCOUNTER — HOSPITAL ENCOUNTER (EMERGENCY)
Dept: HOSPITAL 75 - ER | Age: 37
Discharge: HOME | End: 2017-05-28
Payer: COMMERCIAL

## 2017-05-28 VITALS — BODY MASS INDEX: 47.09 KG/M2 | WEIGHT: 293 LBS | HEIGHT: 66 IN

## 2017-05-28 VITALS — DIASTOLIC BLOOD PRESSURE: 68 MMHG | SYSTOLIC BLOOD PRESSURE: 127 MMHG

## 2017-05-28 DIAGNOSIS — N93.8: ICD-10-CM

## 2017-05-28 DIAGNOSIS — N85.8: Primary | ICD-10-CM

## 2017-05-28 LAB
ALBUMIN SERPL-MCNC: 3.6 G/DL (ref 3.2–4.5)
ALT SERPL-CCNC: 12 U/L (ref 0–55)
ANION GAP SERPL CALC-SCNC: 10 MMOL/L (ref 5–14)
APTT BLD: 30 SEC (ref 24–35)
AST SERPL-CCNC: 11 U/L (ref 5–34)
BASOPHILS # BLD AUTO: 0.1 10^3/UL (ref 0–0.1)
BASOPHILS NFR BLD AUTO: 1 % (ref 0–10)
BILIRUB SERPL-MCNC: 0.4 MG/DL (ref 0.1–1)
BUN SERPL-MCNC: 10 MG/DL (ref 7–18)
BUN/CREAT SERPL: 15
CALCIUM SERPL-MCNC: 8.7 MG/DL (ref 8.5–10.1)
CHLORIDE SERPL-SCNC: 108 MMOL/L (ref 98–107)
CO2 SERPL-SCNC: 22 MMOL/L (ref 21–32)
CREAT SERPL-MCNC: 0.68 MG/DL (ref 0.6–1.3)
EOSINOPHIL # BLD AUTO: 0.2 10^3/UL (ref 0–0.3)
EOSINOPHIL NFR BLD AUTO: 1 % (ref 0–10)
ERYTHROCYTE [DISTWIDTH] IN BLOOD BY AUTOMATED COUNT: 16.3 % (ref 10–14.5)
GFR SERPLBLD BASED ON 1.73 SQ M-ARVRAT: > 60 ML/MIN
GLUCOSE SERPL-MCNC: 109 MG/DL (ref 70–105)
INR PPP: 1.2 (ref 0.8–1.4)
LYMPHOCYTES # BLD AUTO: 2 X 10^3 (ref 1–4)
LYMPHOCYTES NFR BLD AUTO: 16 % (ref 12–44)
MCH RBC QN AUTO: 23 PG (ref 25–34)
MCHC RBC AUTO-ENTMCNC: 31 G/DL (ref 32–36)
MCV RBC AUTO: 75 FL (ref 80–99)
MONOCYTES # BLD AUTO: 0.8 X 10^3 (ref 0–1)
MONOCYTES NFR BLD AUTO: 7 % (ref 0–12)
NEUTROPHILS # BLD AUTO: 8.9 X 10^3 (ref 1.8–7.8)
NEUTROPHILS NFR BLD AUTO: 75 % (ref 42–75)
PLATELET # BLD: 299 10^3/UL (ref 130–400)
PMV BLD AUTO: 11.3 FL (ref 7.4–10.4)
POTASSIUM SERPL-SCNC: 3.6 MMOL/L (ref 3.6–5)
PROT SERPL-MCNC: 6.7 G/DL (ref 6.4–8.2)
PROTHROMBIN TIME: 14.7 SEC (ref 12.2–14.7)
RBC # BLD AUTO: 4.26 10^6/UL (ref 4.35–5.85)
SODIUM SERPL-SCNC: 140 MMOL/L (ref 135–145)
WBC # BLD AUTO: 11.9 10^3/UL (ref 4.3–11)

## 2017-05-28 PROCEDURE — 84443 ASSAY THYROID STIM HORMONE: CPT

## 2017-05-28 PROCEDURE — 36415 COLL VENOUS BLD VENIPUNCTURE: CPT

## 2017-05-28 PROCEDURE — 80053 COMPREHEN METABOLIC PANEL: CPT

## 2017-05-28 PROCEDURE — 84703 CHORIONIC GONADOTROPIN ASSAY: CPT

## 2017-05-28 PROCEDURE — 85730 THROMBOPLASTIN TIME PARTIAL: CPT

## 2017-05-28 PROCEDURE — 85610 PROTHROMBIN TIME: CPT

## 2017-05-28 PROCEDURE — 85025 COMPLETE CBC W/AUTO DIFF WBC: CPT

## 2017-05-28 PROCEDURE — 76830 TRANSVAGINAL US NON-OB: CPT

## 2017-05-28 PROCEDURE — 76856 US EXAM PELVIC COMPLETE: CPT

## 2017-05-28 NOTE — DIAGNOSTIC IMAGING REPORT
US NON OB PELVIS COMP/TRANS VAG



Technique: Transabdominal and transvaginal  grayscale, color

Doppler and pulse duplex imaging of the pelvis was performed.



Indication: Heavy vaginal bleeding with clots.



Comparison: Pelvic ultrasound from 2/19/17.



Findings:

The uterus measures 12.8 x 8.1 x 7.6 cm. Due to patient's body

habitus, the endometrium is not well seen. There is question of a

solid mass near the superior aspect of the uterus measuring 8.9 x

9.5 x 7.6 cm, similar to prior examination.



No suspicious adnexal mass. However, the ovaries are not

identified with certainty due to patient's body habitus.

Transvaginal imaging does not aid in detecting the ovaries. No

free pelvic fluid.



Impression: 

1. Limited examination due to patient body habitus.

2. 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

exophytic fibroid, and is unchanged.

3. Suboptimal evaluation of endometrium due to patient body

habitus.

4. Findings are in agreement with the preliminary report.



Dictated by: 



  Dictated on workstation # [id number]

## 2017-05-28 NOTE — ED GU-FEMALE
General


Chief Complaint:  -Female


Stated Complaint:  VAG BLEEDING CLOTS


Nursing Triage Note:  


Pt c/o vaginal bleeding x 4 weeks.


Nursing Sepsis Screen:  No Definite Risk


Source:  patient, old records


Exam Limitations:  no limitations





History of Present Illness


Time seen by provider:  00:46


Initial Comments


This 36-year-old patient of Dr. Louise's presents with vaginal bleeding since 

the end of April.  She has been passing large clots and soaking through pads 

over the last 24 hours.  She has been taking norethindrone 5 mg daily and was 

told recently to double the dose.  She had an ultrasound performed a few months 

ago demonstrating an exophytic mass, possibly a fibroid.  She has a follow-up 

in a couple of weeks and repeat ultrasonography is anticipated.  Patient is not 

taking any estrogen due to history of migraines and increased risk of DVT.  She 

denies any associated lightheadedness or shortness of breath.  She reports 

endometrial biopsy has already been performed.  Her  is gone out of town 

and she is concerned about the degree of her bleeding.  She has some cramping 

and backache but no significant abdominal pain.





Allergies and Home Medications


Allergies


Coded Allergies:  


     No Known Drug Allergies (Unverified , 3/10/17)





Home Medications


Cefdinir 300 Mg Capsule, 300 MG PO BID, #14 Ref 0


   Prescribed by: ROSE DIETRICH on 17 1150


Hydrocodone/Acetaminophen 1 Each Tablet, 1 EACH PO Q4H PRN for PAIN, #10


   Prescribed by: NENA LOUISE on 3/13/17 0944


Hydrocodone/Acetaminophen 1 Each Tablet, 1 EACH PO Q4H PRN for PAIN, #14 Ref 0


   Prescribed by: ROSE DIETRICH on 17 1150


Ibuprofen 600 Mg Tablet, 600 MG PO Q6H, #30


   Prescribed by: NENA LOUISE on 3/13/17 0944


Norethindrone 5 Mg Tab, 5 MG PO DAILY, #30 (Reported)


Norethindrone 5 Mg Tab, 10 MG PO BID, #40


   Prescribed by: RACHEL DÍAZ on 17 031


Prenatal Vit W-Ca,Fe,FA(<1 mg) 1 Each Tablet, 1 EACH PO DAILY, #30


   Prescribed by: RACHEL DÍAZ on 17





Constitutional:  no symptoms reported


EENTM:  no symptoms reported


Respiratory:  no symptoms reported


Cardiovascular:  no symptoms reported


Gastrointestinal:  no symptoms reported


Genitourinary:  see HPI


Pregnant:  No


Musculoskeletal:  no symptoms reported


Skin:  no symptoms reported


Psychiatric/Neurological:  No Symptoms Reported


Hematologic/Lymphatic:  See HPI





Past Medical-Social-Family Hx


Patient Social History


Alcohol Use:  Occasionally Uses


Recreational Drug Use:  No


Smoking Status:  Never a Smoker


Recent Foreign Travel:  No


Contact w/Someone Who Travel:  No


Recent Infectious Disease Expo:  No


Recent Hopitalizations:  No





Immunizations Up To Date


Tetanus Booster (TDap):  Unknown





Seasonal Allergies


Seasonal Allergies:  Yes





Surgeries


HX Surgeries:  Yes (C/S x4, WISDOM TEETH, endometrial biopsy)


Surgeries:   Section, Tubal Ligation





Respiratory


Hx Respiratory Disorders:  Yes


Respiratory Disorders:  Sleep Apnea





Cardiovascular


Hx Cardiac Disorders:  No





Neurological


Hx Neurological Disorders:  Yes


Neurological Disorders:  Headaches /Migraines (atypical migraine with hemiplegia

)





Reproductive System


Pregnant:  No


Hx Reproductive Disorders:  Yes (AUB, DUB)





Genitourinary


Hx Genitourinary Disorders:  No





Gastrointestinal


Hx Gastrointestinal Disorders:  No





Musculoskeletal


Hx Musculoskeletal Disorders:  Yes (HIP JOINT PAIN)





Endocrine


Hx Endocrine Disorders:  Yes (morbid obesity)





HEENT


HX ENT Disorders:  No


Loss of Vision:  Denies


Hearing Impairment:  Denies





Cancer


Hx Cancer:  No





Psychosocial


Hx Psychiatric Problems:  No





Integumentary


HX Skin/Integumentary Disorder:  No





Blood Transfusions


Hx Blood Disorders:  Yes (MILD ANEMIA)





Family Medical History


Significant Family History:  No Pertinent Family Hx





Physical Exam


Vital Signs





Vital Sign - Last 12Hours








 17





 00:47


 


Temp 97.6


 


Pulse 100


 


Resp 18


 


B/P (MAP) 179/99


 


Pulse Ox 98





Capillary Refill : Less Than 3 Seconds


General Appearance:  WD/WN, obese, other (anxious)


HEENT:  normal ENT inspection


Neck:  normal inspection


Cardiovascular:  no edema, no murmur, tachycardia


Respiratory:  lungs clear, normal breath sounds, no respiratory distress, no 

accessory muscle use


Gastrointestinal:  normal bowel sounds, non tender, soft


Extremities:  normal inspection


Neurologic/Psychiatric:  CNs II-XII nml as tested, no motor/sensory deficits, 

alert, oriented x 3, other (mildly anxious)


Skin:  normal color, warm/dry





Progress/Results/Core Measures


Results/Orders


Lab Results





My Orders





Vital Signs/I&O











Blood Pressure Mean:  125








Progress Note :  


Progress Note


Labs and ultrasound ordered for further evaluation.  Patient was mildly 

tachycardic and IV fluids were therefore administered.  No major changes were 

observed in the repeat ultrasound.  Case was discussed with Dr. Greenberg who 

suggested increasing the norethindrone dose to 10 mg twice daily.  Patient's 

bleeding had started to decline prior to discharge.  Patient felt comfortable 

returning home with close follow-up with Dr. Louise.





Diagnostic Imaging





   Diagonstic Imaging:  Ultrasound


Comments


Ultrasound discussed with the technician and statrad report reviewed.  There 

was a pelvic mass observed measuring greater than 9 cm at its widest diameter 

that could represent A exophytic fibroid or an ovarian mass.





Departure


Impression


Impression:  


 Primary Impression:  


 Dysfunctional uterine bleeding


 Additional Impression:  


 Uterine mass


Disposition:   HOME, SELF-CARE


Condition:  Improved





Departure-Patient Inst.


Decision time for Depature:  03:13


Referrals:  


FORTUNATO SAUNDERS DO (PCP/Family)


Primary Care Physician


Patient Instructions:  Uterine Fibroids





Add. Discharge Instructions:  


Follow-up with Dr. Louise as soon as possible.  Call the office on Tuesday to 

see if an earlier appointment as possible.  Return to emergency room if 

symptoms worsen.  Increase the norethindrone to 10 mg twice daily.  Take your 

iron supplement or a multivitamin to help replace your lost iron.











All discharge instructions reviewed with patient and/or family. Voiced 

understanding.


Scripts


Prenatal Vit W-Ca,Fe,FA(<1 mg) (Prenatal Vitamins) 1 Each Tablet


1 EACH PO DAILY, #30 TAB


   Prov: RACHEL CHIANG MD         17 


Norethindrone (Norethindrone Acetate) 5 Mg Tab


10 MG PO BID, #40 TAB


   Prov: RACHEL CHIANG MD         17





Copy


Copies To 1:   NENA LOUISE MD, JOSHUA T MD May 28, 2017 03:12

## 2017-07-13 ENCOUNTER — HOSPITAL ENCOUNTER (OUTPATIENT)
Dept: HOSPITAL 75 - PREOP | Age: 37
Discharge: HOME | End: 2017-07-13
Attending: OBSTETRICS & GYNECOLOGY
Payer: COMMERCIAL

## 2017-07-13 VITALS — BODY MASS INDEX: 47.09 KG/M2 | WEIGHT: 293 LBS | HEIGHT: 66 IN

## 2017-07-13 VITALS — SYSTOLIC BLOOD PRESSURE: 135 MMHG | DIASTOLIC BLOOD PRESSURE: 86 MMHG

## 2017-07-13 DIAGNOSIS — N93.8: ICD-10-CM

## 2017-07-13 DIAGNOSIS — Z01.812: Primary | ICD-10-CM

## 2017-07-13 DIAGNOSIS — Z11.2: ICD-10-CM

## 2017-07-13 DIAGNOSIS — D25.9: ICD-10-CM

## 2017-07-13 DIAGNOSIS — D50.0: ICD-10-CM

## 2017-07-13 LAB
BASOPHILS # BLD AUTO: 0.1 10^3/UL (ref 0–0.1)
BASOPHILS NFR BLD AUTO: 1 % (ref 0–10)
EOSINOPHIL # BLD AUTO: 0.2 10^3/UL (ref 0–0.3)
EOSINOPHIL NFR BLD AUTO: 2 % (ref 0–10)
ERYTHROCYTE [DISTWIDTH] IN BLOOD BY AUTOMATED COUNT: 14.6 % (ref 10–14.5)
LYMPHOCYTES # BLD AUTO: 1.3 X 10^3 (ref 1–4)
LYMPHOCYTES NFR BLD AUTO: 16 % (ref 12–44)
MCH RBC QN AUTO: 22 PG (ref 25–34)
MCHC RBC AUTO-ENTMCNC: 30 G/DL (ref 32–36)
MCV RBC AUTO: 72 FL (ref 80–99)
MONOCYTES # BLD AUTO: 0.4 X 10^3 (ref 0–1)
MONOCYTES NFR BLD AUTO: 5 % (ref 0–12)
NEUTROPHILS # BLD AUTO: 6.2 X 10^3 (ref 1.8–7.8)
NEUTROPHILS NFR BLD AUTO: 76 % (ref 42–75)
PLATELET # BLD: 372 10^3/UL (ref 130–400)
PMV BLD AUTO: 11.5 FL (ref 7.4–10.4)
RBC # BLD AUTO: 4.56 10^6/UL (ref 4.35–5.85)
WBC # BLD AUTO: 8.1 10^3/UL (ref 4.3–11)

## 2017-07-13 PROCEDURE — 36415 COLL VENOUS BLD VENIPUNCTURE: CPT

## 2017-07-13 PROCEDURE — 86901 BLOOD TYPING SEROLOGIC RH(D): CPT

## 2017-07-13 PROCEDURE — 86900 BLOOD TYPING SEROLOGIC ABO: CPT

## 2017-07-13 PROCEDURE — 86850 RBC ANTIBODY SCREEN: CPT

## 2017-07-13 PROCEDURE — 85025 COMPLETE CBC W/AUTO DIFF WBC: CPT

## 2017-07-13 PROCEDURE — 87081 CULTURE SCREEN ONLY: CPT

## 2017-07-20 ENCOUNTER — HOSPITAL ENCOUNTER (OUTPATIENT)
Dept: HOSPITAL 75 - SDC | Age: 37
LOS: 1 days | Discharge: HOME | End: 2017-07-21
Attending: OBSTETRICS & GYNECOLOGY
Payer: COMMERCIAL

## 2017-07-20 VITALS — DIASTOLIC BLOOD PRESSURE: 63 MMHG | SYSTOLIC BLOOD PRESSURE: 106 MMHG

## 2017-07-20 VITALS — DIASTOLIC BLOOD PRESSURE: 62 MMHG | SYSTOLIC BLOOD PRESSURE: 118 MMHG

## 2017-07-20 VITALS — DIASTOLIC BLOOD PRESSURE: 63 MMHG | SYSTOLIC BLOOD PRESSURE: 105 MMHG

## 2017-07-20 VITALS — DIASTOLIC BLOOD PRESSURE: 67 MMHG | SYSTOLIC BLOOD PRESSURE: 134 MMHG

## 2017-07-20 VITALS — HEIGHT: 66 IN | BODY MASS INDEX: 47.09 KG/M2 | WEIGHT: 293 LBS

## 2017-07-20 DIAGNOSIS — E66.01: ICD-10-CM

## 2017-07-20 DIAGNOSIS — D25.1: ICD-10-CM

## 2017-07-20 DIAGNOSIS — N73.6: ICD-10-CM

## 2017-07-20 DIAGNOSIS — N93.8: Primary | ICD-10-CM

## 2017-07-20 DIAGNOSIS — D50.0: ICD-10-CM

## 2017-07-20 PROCEDURE — 96361 HYDRATE IV INFUSION ADD-ON: CPT

## 2017-07-20 PROCEDURE — 96375 TX/PRO/DX INJ NEW DRUG ADDON: CPT

## 2017-07-20 PROCEDURE — 86920 COMPATIBILITY TEST SPIN: CPT

## 2017-07-20 PROCEDURE — 36430 TRANSFUSION BLD/BLD COMPNT: CPT

## 2017-07-20 PROCEDURE — 88307 TISSUE EXAM BY PATHOLOGIST: CPT

## 2017-07-20 PROCEDURE — 96376 TX/PRO/DX INJ SAME DRUG ADON: CPT

## 2017-07-20 PROCEDURE — 85025 COMPLETE CBC W/AUTO DIFF WBC: CPT

## 2017-07-20 PROCEDURE — 36415 COLL VENOUS BLD VENIPUNCTURE: CPT

## 2017-07-20 PROCEDURE — 84703 CHORIONIC GONADOTROPIN ASSAY: CPT

## 2017-07-20 PROCEDURE — 94664 DEMO&/EVAL PT USE INHALER: CPT

## 2017-07-20 RX ADMIN — ONDANSETRON PRN MG: 2 INJECTION, SOLUTION INTRAMUSCULAR; INTRAVENOUS at 13:44

## 2017-07-20 RX ADMIN — SODIUM CHLORIDE, SODIUM LACTATE, POTASSIUM CHLORIDE, AND CALCIUM CHLORIDE PRN MLS/HR: 600; 310; 30; 20 INJECTION, SOLUTION INTRAVENOUS at 12:47

## 2017-07-20 RX ADMIN — HYDROMORPHONE HYDROCHLORIDE PRN MG: 2 INJECTION, SOLUTION INTRAMUSCULAR; INTRAVENOUS; SUBCUTANEOUS at 13:40

## 2017-07-20 RX ADMIN — ONDANSETRON PRN MG: 2 INJECTION, SOLUTION INTRAMUSCULAR; INTRAVENOUS at 13:18

## 2017-07-20 RX ADMIN — SODIUM CHLORIDE, SODIUM LACTATE, POTASSIUM CHLORIDE, AND CALCIUM CHLORIDE PRN MLS/HR: 600; 310; 30; 20 INJECTION, SOLUTION INTRAVENOUS at 07:45

## 2017-07-20 RX ADMIN — SODIUM CHLORIDE, SODIUM LACTATE, POTASSIUM CHLORIDE, AND CALCIUM CHLORIDE SCH MLS/HR: 600; 310; 30; 20 INJECTION, SOLUTION INTRAVENOUS at 19:13

## 2017-07-20 RX ADMIN — HYDROCODONE BITARTRATE AND ACETAMINOPHEN PRN EA: 7.5; 325 TABLET ORAL at 17:04

## 2017-07-20 RX ADMIN — SODIUM CHLORIDE, SODIUM LACTATE, POTASSIUM CHLORIDE, AND CALCIUM CHLORIDE PRN MLS/HR: 600; 310; 30; 20 INJECTION, SOLUTION INTRAVENOUS at 10:00

## 2017-07-20 RX ADMIN — HYDROCODONE BITARTRATE AND ACETAMINOPHEN PRN EA: 7.5; 325 TABLET ORAL at 22:56

## 2017-07-20 RX ADMIN — HYDROMORPHONE HYDROCHLORIDE PRN MG: 2 INJECTION, SOLUTION INTRAMUSCULAR; INTRAVENOUS; SUBCUTANEOUS at 13:50

## 2017-07-20 RX ADMIN — HYDROMORPHONE HYDROCHLORIDE PRN MG: 2 INJECTION, SOLUTION INTRAMUSCULAR; INTRAVENOUS; SUBCUTANEOUS at 13:30

## 2017-07-20 RX ADMIN — HYDROMORPHONE HYDROCHLORIDE PRN MG: 2 INJECTION, SOLUTION INTRAMUSCULAR; INTRAVENOUS; SUBCUTANEOUS at 13:20

## 2017-07-20 RX ADMIN — KETOROLAC TROMETHAMINE PRN MG: 30 INJECTION, SOLUTION INTRAMUSCULAR; INTRAVENOUS at 19:04

## 2017-07-20 NOTE — DISCHARGE INST-WOMEN'S SERVICE
Discharge Inst-Women's Serv


Depart Medication/Instructions


New, Converted or Re-Newed RX:  RX on Chart





Consults/Follow Up


Additional Follow Up:  Yes


Orders/Referrals


Dr. Lakhani in 7-10 days and in 8 weeks





Activity


Activity:  Activity as Tolerated


Driving Instructions:  No Driving for 1 Week


NO SMOKING:  NO SMOKING


Nothing Inside Vagina:  No Douching, No [place], No Tampons





Diet


Discharge Diet:  No Restrictions


Symptoms to Report to :  Bleeding Excessive, Pain Increased, Fever Over 101 

Degrees F, Vaginal Bleeding Increase, Questions/Concerns


For Any Problems or Questions:  Contact Your Physician





Skin/Wound Care


Infection Signs and Symptoms:  Increased Redness, Foul Odor of Wound, Increased 

Drainage, Skin Itchy or Has a Rash, Increased Swelling, Temperature Above 101  F


Operative Area Clean and Dry:  Keep Incision Clean/Dry


Stitches/Staples/Dermabond:  Dermabond, Care of Stitches


Bathing Instructions:  RENETTA Pulido DO Jul 20, 2017 8:54 am

## 2017-07-20 NOTE — PROGRESS NOTE-PRE OPERATIVE
Pre-Operative Progress Note


H&P Reviewed


The H&P was reviewed, patient examined and no changes noted.


Date Seen by Provider:  Jul 20, 2017


Time Seen by Provider:  08:20


Date H&P Reviewed:  Jul 20, 2017


Time H&P Reviewed:  08:20


Pre-Operative Diagnosis:  AUB, Fibroid uterus, Anemia chronic blood loss, BMI 63











RENETTA GARCIA DO Jul 20, 2017 8:34 am

## 2017-07-20 NOTE — PROGRESS NOTE-POST OPERATIVE
Post-Operative Progess Note


Surgeon (s)/Assistant (s)


Surgeon


RENETTA GARCIA DO


Assistant:  Lillian Lopez





Pre-Operative Diagnosis


AUB, Fibroid uterus, Anemia chronic blood loss, BMI 63





Post-Operative Diagnosis





same +


severe anterior wall adhesions, and bladder adhesions





Procedure & Operative Findings


Date of Procedure


7/20/17


Procedure Performed/Findings


Findings: very enlarged fibroid uterus >320 gms, grossly normal tubes and 

ovaries.  Dense adhesion of the anterior uterine fundus all the way down the 

anterior vesicouterine pouch.  Obliteration of the vesicouterine pouch.  


EBL: 400


UOP: 2500


Fluids: 2100 LR








Procedure in detail:


The patient taken the operating room where general anesthesia was found to be 

adequate.  She is placed in dorsal lithotomy position prepped and draped in 

normal sterile fashion.  She is first examined under anesthesia the uterus is 

fixed anteverted, no adnexal masses are appreciated.  A weighted speculum was 

inserted patient's vagina after Kahn catheter is placed using sterile 

technique.  A right angle retractor is used to visualize the cervix.  This 

grasped at the 12 o'clock position using a long Allis clamp.  A 0 Vicryl 

sutures placed the anterior lip of the cervix and uses my retraction point.  

The Allises removed.  I didn't send uterine cavity depth which is found to be 8 

cm.  I selected a 8 cm Martina uterine manipulator tip, and a 4 cm colpotomy ring.

  The remaining is and placed into the endometrial cavity the balloon was 

deployed and the colpotomy ring is advanced around the vaginal fornix.  I then 

removal all of the other instruments from the patient's vagina and taken back 

into the abdomen where infraumbilically I infiltrated 0 using quarter percent 

Marcaine and make an 8 mm incision using the knife.  I directed varies needle 

through this incision until intraperitoneal placement was confirmed using a 

saline drop test.  An opening pressure of 4 mmHg is noted using CO2 gas I 

insufflate the peritoneal cavity to a pressure of 15 mmHg at which point I 

removed the varies needle and introduce an 8 mm blunt da Fernando camera trocar.  

Once this is in place unable to confirm intraperitoneal placement using the da 

Fernando laparoscope.  I then had the patient placed in steep Trendelenburg and am 

able to visualize all of the adequate structures to perform the procedure as 

planned.  There is no evidence of damage upon my entry site.  I then direct 2 

more trochars through the abdominal wall using both 8 mm trochars placed 

approximately 10 cm lateral to my infraumbilical trocar.  Incisions are made 

using the knife, and the trochars are directed under direct visualization of 

the laparoscope.  Once these are in place I bring in the da Fernando robot and 

docked in appropriate fashion.  I placed the vessel sealer and the left and 

monopolar harmony in the right hand.   I recognize the anterior abdominal wall 

adhesion blocking my view of the pelvic anatomy.  Taking over 60 minutes of 

meticulously dissection and separation I take down the anterior abdominal wall. 

I then performed the following dissection bilaterally.  Starting at the IP 

ligament I bipolar cauterized this and transected using the vessel sealer.  I 

then grasped the round ligament bipolar cauterized this and transected using 

the vessel sealer.  Anatomy able to grasp the entire broad ligament bipolar 

cauterized and transected using the vessel sealer down to the level of the 

lower uterine segment.  At this point a separate anterior posterior leaflets, 

the anterior leaf was taken around to the anterior vaginal fornix, and the 

posterior leaflets taken onto the posterior vaginal fornix.  This allows me to 

visualize the uterine vessels laterally which I bipolar cauterized and 

transected using the vessel sealer.  I required a fourth trocar site nursing home 

through the procedure due to bowel and the posterior cul-de-sac encroaching on 

the posterior vaginal fornix and posterior colpotomy incision.  Therefore that 

was placed using an injection of 25% percent Marcaine, a 12 mm incision was 

made with a knife and the trocar was advanced under direct visualization of the 

laparoscope.  Once in place a fan retractor is used to retract the bowel for 

safe dissection.  I did perform a colpotomy at the 12 o'clock position using 

monopolar harmony were unable to visualize the Martina colpotomy ring.  I take this 

incision circumferentially around the colpotomy ring using the monopolar harmony 

amputating  supra cervically.  The entire specimen was then placed in the upper 

abdomen, while I take down the remaining vasculature from the cardinal ligaments

, enter the vaginal fornix, and amputate the cervix from the vaginal.  I then 

remove both through the vagina causing a vaginal laceration in doing so which 

is a lateral left sulcal laceration that I repair using 2-0 vicryl suture after 

the procedure is complete .  I then proceeded to closing the vaginal cuff and 

the lateral vaginal apices i use 2-0 Vicryl suture in a figure-of-eight fashion 

couple suspending them to the uterosacral ligaments.  I then closed the 

remainder the vaginal cuff using 20V lock in a running fashion.  There is no 

active bleeding noted from any my dissection planes at which point I undocked 

the da Fernando robot and proceed with the remainder of the case laparoscopically.

  Once again I copiously irrigated the pelvis using normal saline as no active 

bleeding noted I placed FloSeal hemostatic agent overall my planes of 

dissection to ensure excellent postoperative hemostasis.  I then had the 

patient flattened out and remove the lateral trochars under direct 

visualization of the laparoscope.  The infraumbilical trocar is used to release 

insufflation, and introduce 10 mL's of 0.25% Marcaine into the peritoneal 

cavity for postoperative pain management.  The skin incisions are then closed 

using 4-0 Monocryl and interrupted subcuticular stitches and Dermabond and Band-

Aids are placed over these.   Vaginal packing is placed due to extensive 

vaginal laceration.   2 gms Ancef and 500 mg Flagyl are given preoperatively 

for infection prophylaxis.


Anesthesia Type


geta





Estimated Blood Loss


Estimated blood loss (mL):  400





Specimens/Packing


Specimens Removed


uterus











RENETTA GARCIA DO Jul 20, 2017 8:46 am

## 2017-07-21 VITALS — SYSTOLIC BLOOD PRESSURE: 138 MMHG | DIASTOLIC BLOOD PRESSURE: 75 MMHG

## 2017-07-21 VITALS — SYSTOLIC BLOOD PRESSURE: 129 MMHG | DIASTOLIC BLOOD PRESSURE: 80 MMHG

## 2017-07-21 VITALS — DIASTOLIC BLOOD PRESSURE: 77 MMHG | SYSTOLIC BLOOD PRESSURE: 126 MMHG

## 2017-07-21 VITALS — DIASTOLIC BLOOD PRESSURE: 77 MMHG | SYSTOLIC BLOOD PRESSURE: 116 MMHG

## 2017-07-21 VITALS — DIASTOLIC BLOOD PRESSURE: 75 MMHG | SYSTOLIC BLOOD PRESSURE: 129 MMHG

## 2017-07-21 VITALS — SYSTOLIC BLOOD PRESSURE: 92 MMHG | DIASTOLIC BLOOD PRESSURE: 62 MMHG

## 2017-07-21 VITALS — SYSTOLIC BLOOD PRESSURE: 105 MMHG | DIASTOLIC BLOOD PRESSURE: 63 MMHG

## 2017-07-21 LAB
BASOPHILS # BLD AUTO: 0 10^3/UL (ref 0–0.1)
BASOPHILS NFR BLD AUTO: 0 % (ref 0–10)
EOSINOPHIL # BLD AUTO: 0 10^3/UL (ref 0–0.3)
EOSINOPHIL NFR BLD AUTO: 0 % (ref 0–10)
ERYTHROCYTE [DISTWIDTH] IN BLOOD BY AUTOMATED COUNT: 14.1 % (ref 10–14.5)
LYMPHOCYTES # BLD AUTO: 1.1 X 10^3 (ref 1–4)
LYMPHOCYTES NFR BLD AUTO: 10 % (ref 12–44)
MCH RBC QN AUTO: 21 PG (ref 25–34)
MCHC RBC AUTO-ENTMCNC: 29 G/DL (ref 32–36)
MCV RBC AUTO: 75 FL (ref 80–99)
MONOCYTES # BLD AUTO: 0.7 X 10^3 (ref 0–1)
MONOCYTES NFR BLD AUTO: 6 % (ref 0–12)
NEUTROPHILS # BLD AUTO: 9.4 X 10^3 (ref 1.8–7.8)
NEUTROPHILS NFR BLD AUTO: 84 % (ref 42–75)
PLATELET # BLD: 325 10^3/UL (ref 130–400)
PMV BLD AUTO: 11.2 FL (ref 7.4–10.4)
RBC # BLD AUTO: 3.61 10^6/UL (ref 4.35–5.85)
WBC # BLD AUTO: 11.2 10^3/UL (ref 4.3–11)

## 2017-07-21 RX ADMIN — IBUPROFEN PRN MG: 600 TABLET ORAL at 12:45

## 2017-07-21 RX ADMIN — SODIUM CHLORIDE, SODIUM LACTATE, POTASSIUM CHLORIDE, AND CALCIUM CHLORIDE SCH MLS/HR: 600; 310; 30; 20 INJECTION, SOLUTION INTRAVENOUS at 08:41

## 2017-07-21 RX ADMIN — SODIUM CHLORIDE, SODIUM LACTATE, POTASSIUM CHLORIDE, AND CALCIUM CHLORIDE SCH MLS/HR: 600; 310; 30; 20 INJECTION, SOLUTION INTRAVENOUS at 08:42

## 2017-07-21 RX ADMIN — IBUPROFEN PRN MG: 600 TABLET ORAL at 07:36

## 2017-07-21 RX ADMIN — HYDROCODONE BITARTRATE AND ACETAMINOPHEN PRN EA: 7.5; 325 TABLET ORAL at 05:09

## 2017-07-21 RX ADMIN — HYDROCODONE BITARTRATE AND ACETAMINOPHEN PRN EA: 7.5; 325 TABLET ORAL at 12:45

## 2017-07-21 RX ADMIN — HYDROCODONE BITARTRATE AND ACETAMINOPHEN PRN EA: 7.5; 325 TABLET ORAL at 19:00

## 2017-07-21 RX ADMIN — KETOROLAC TROMETHAMINE PRN MG: 30 INJECTION, SOLUTION INTRAMUSCULAR; INTRAVENOUS at 01:19

## 2017-07-21 RX ADMIN — IBUPROFEN PRN MG: 600 TABLET ORAL at 19:09

## 2017-07-21 NOTE — ANESTHESIA-GENERAL POST-OP
General


Patient Condition


Mental Status/LOC:  Same as Preop


Cardiovascular:  Satisfactory


Nausea/Vomiting:  Absent


Respiratory:  Satisfactory


Pain:  Controlled


Complications:  Absent





Post Op Complications


Complications


None





Follow Up Care/Instructions


Patient Instructions


None needed.





Anesthesia/Patient Condition


Patient Condition


Patient is doing well, no complaints, stable vital signs, no apparent adverse 

anesthesia problems.   


No complications reported per nursing.











PARIS VASQUEZ CRNA Jul 21, 2017 11:01

## 2017-10-26 ENCOUNTER — HOSPITAL ENCOUNTER (OUTPATIENT)
Dept: HOSPITAL 75 - RAD | Age: 37
End: 2017-10-26
Attending: NURSE PRACTITIONER
Payer: COMMERCIAL

## 2017-10-26 DIAGNOSIS — R05: Primary | ICD-10-CM

## 2017-10-26 PROCEDURE — 71020: CPT

## 2017-10-26 NOTE — DIAGNOSTIC IMAGING REPORT
PA and lateral views of the chest



Indication:  Cough



Findings: The lungs are clear. The heart size is normal. There is

no effusion or pneumothorax



The mediastinum and suad appear unremarkable.



Impression: Unremarkable study.



Dictated by: 



  Dictated on workstation # [id number]

## 2023-05-23 ENCOUNTER — HOSPITAL ENCOUNTER (OUTPATIENT)
Dept: HOSPITAL 75 - ER | Age: 43
Setting detail: OBSERVATION
LOS: 1 days | Discharge: HOME | End: 2023-05-24
Attending: INTERNAL MEDICINE | Admitting: INTERNAL MEDICINE
Payer: COMMERCIAL

## 2023-05-23 VITALS — DIASTOLIC BLOOD PRESSURE: 92 MMHG | SYSTOLIC BLOOD PRESSURE: 152 MMHG

## 2023-05-23 VITALS — DIASTOLIC BLOOD PRESSURE: 84 MMHG | SYSTOLIC BLOOD PRESSURE: 150 MMHG

## 2023-05-23 VITALS — DIASTOLIC BLOOD PRESSURE: 101 MMHG | SYSTOLIC BLOOD PRESSURE: 153 MMHG

## 2023-05-23 VITALS — SYSTOLIC BLOOD PRESSURE: 146 MMHG | DIASTOLIC BLOOD PRESSURE: 95 MMHG

## 2023-05-23 VITALS — SYSTOLIC BLOOD PRESSURE: 155 MMHG | DIASTOLIC BLOOD PRESSURE: 93 MMHG

## 2023-05-23 VITALS — DIASTOLIC BLOOD PRESSURE: 106 MMHG | SYSTOLIC BLOOD PRESSURE: 157 MMHG

## 2023-05-23 VITALS — HEIGHT: 66.02 IN | WEIGHT: 293 LBS | BODY MASS INDEX: 47.09 KG/M2

## 2023-05-23 VITALS — SYSTOLIC BLOOD PRESSURE: 126 MMHG | DIASTOLIC BLOOD PRESSURE: 71 MMHG

## 2023-05-23 VITALS — DIASTOLIC BLOOD PRESSURE: 108 MMHG | SYSTOLIC BLOOD PRESSURE: 153 MMHG

## 2023-05-23 VITALS — DIASTOLIC BLOOD PRESSURE: 83 MMHG | SYSTOLIC BLOOD PRESSURE: 141 MMHG

## 2023-05-23 VITALS — SYSTOLIC BLOOD PRESSURE: 131 MMHG | DIASTOLIC BLOOD PRESSURE: 78 MMHG

## 2023-05-23 VITALS — DIASTOLIC BLOOD PRESSURE: 92 MMHG | SYSTOLIC BLOOD PRESSURE: 158 MMHG

## 2023-05-23 VITALS — DIASTOLIC BLOOD PRESSURE: 92 MMHG | SYSTOLIC BLOOD PRESSURE: 139 MMHG

## 2023-05-23 DIAGNOSIS — R03.0: ICD-10-CM

## 2023-05-23 DIAGNOSIS — R07.89: Primary | ICD-10-CM

## 2023-05-23 DIAGNOSIS — Z28.310: ICD-10-CM

## 2023-05-23 DIAGNOSIS — I10: ICD-10-CM

## 2023-05-23 DIAGNOSIS — E66.01: ICD-10-CM

## 2023-05-23 DIAGNOSIS — E11.649: ICD-10-CM

## 2023-05-23 DIAGNOSIS — E11.65: ICD-10-CM

## 2023-05-23 DIAGNOSIS — K21.9: ICD-10-CM

## 2023-05-23 LAB
ALBUMIN SERPL-MCNC: 4.1 GM/DL (ref 3.2–4.5)
ALP SERPL-CCNC: 112 U/L (ref 40–136)
ALT SERPL-CCNC: 28 U/L (ref 0–55)
APTT BLD: 28 SEC (ref 24–35)
BASOPHILS # BLD AUTO: 0.1 10^3/UL (ref 0–0.1)
BASOPHILS NFR BLD AUTO: 1 % (ref 0–10)
BILIRUB SERPL-MCNC: 1.3 MG/DL (ref 0.1–1)
BUN/CREAT SERPL: 14
CALCIUM SERPL-MCNC: 9.6 MG/DL (ref 8.5–10.1)
CHLORIDE SERPL-SCNC: 104 MMOL/L (ref 98–107)
CO2 SERPL-SCNC: 24 MMOL/L (ref 21–32)
CREAT SERPL-MCNC: 0.74 MG/DL (ref 0.6–1.3)
EOSINOPHIL # BLD AUTO: 0.2 10^3/UL (ref 0–0.3)
EOSINOPHIL NFR BLD AUTO: 3 % (ref 0–10)
GFR SERPLBLD BASED ON 1.73 SQ M-ARVRAT: 103 ML/MIN
GLUCOSE SERPL-MCNC: 129 MG/DL (ref 70–105)
HCT VFR BLD CALC: 45 % (ref 35–52)
HGB BLD-MCNC: 14.8 G/DL (ref 11.5–16)
INR PPP: 1 (ref 0.8–1.4)
LIPASE SERPL-CCNC: 20 U/L (ref 8–78)
LYMPHOCYTES # BLD AUTO: 1.6 10^3/UL (ref 1–4)
LYMPHOCYTES NFR BLD AUTO: 19 % (ref 12–44)
MAGNESIUM SERPL-MCNC: 2 MG/DL (ref 1.6–2.4)
MANUAL DIFFERENTIAL PERFORMED BLD QL: NO
MCH RBC QN AUTO: 27 PG (ref 25–34)
MCHC RBC AUTO-ENTMCNC: 33 G/DL (ref 32–36)
MCV RBC AUTO: 81 FL (ref 80–99)
MONOCYTES # BLD AUTO: 0.4 10^3/UL (ref 0–1)
MONOCYTES NFR BLD AUTO: 5 % (ref 0–12)
NEUTROPHILS # BLD AUTO: 5.9 10^3/UL (ref 1.8–7.8)
NEUTROPHILS NFR BLD AUTO: 72 % (ref 42–75)
PLATELET # BLD: 282 10^3/UL (ref 130–400)
PMV BLD AUTO: 11.4 FL (ref 9–12.2)
POTASSIUM SERPL-SCNC: 4 MMOL/L (ref 3.6–5)
PROT SERPL-MCNC: 7.7 GM/DL (ref 6.4–8.2)
PROTHROMBIN TIME: 13.6 SEC (ref 12.2–14.7)
SODIUM SERPL-SCNC: 138 MMOL/L (ref 135–145)
WBC # BLD AUTO: 8.2 10^3/UL (ref 4.3–11)

## 2023-05-23 PROCEDURE — 36415 COLL VENOUS BLD VENIPUNCTURE: CPT

## 2023-05-23 PROCEDURE — 78452 HT MUSCLE IMAGE SPECT MULT: CPT

## 2023-05-23 PROCEDURE — 96366 THER/PROPH/DIAG IV INF ADDON: CPT

## 2023-05-23 PROCEDURE — 85610 PROTHROMBIN TIME: CPT

## 2023-05-23 PROCEDURE — 83874 ASSAY OF MYOGLOBIN: CPT

## 2023-05-23 PROCEDURE — 93017 CV STRESS TEST TRACING ONLY: CPT

## 2023-05-23 PROCEDURE — 76705 ECHO EXAM OF ABDOMEN: CPT

## 2023-05-23 PROCEDURE — 84484 ASSAY OF TROPONIN QUANT: CPT

## 2023-05-23 PROCEDURE — 85730 THROMBOPLASTIN TIME PARTIAL: CPT

## 2023-05-23 PROCEDURE — 82947 ASSAY GLUCOSE BLOOD QUANT: CPT

## 2023-05-23 PROCEDURE — 83880 ASSAY OF NATRIURETIC PEPTIDE: CPT

## 2023-05-23 PROCEDURE — 80053 COMPREHEN METABOLIC PANEL: CPT

## 2023-05-23 PROCEDURE — 85379 FIBRIN DEGRADATION QUANT: CPT

## 2023-05-23 PROCEDURE — 80061 LIPID PANEL: CPT

## 2023-05-23 PROCEDURE — 83036 HEMOGLOBIN GLYCOSYLATED A1C: CPT

## 2023-05-23 PROCEDURE — 80048 BASIC METABOLIC PNL TOTAL CA: CPT

## 2023-05-23 PROCEDURE — 93041 RHYTHM ECG TRACING: CPT

## 2023-05-23 PROCEDURE — 93005 ELECTROCARDIOGRAM TRACING: CPT

## 2023-05-23 PROCEDURE — 96376 TX/PRO/DX INJ SAME DRUG ADON: CPT

## 2023-05-23 PROCEDURE — 71045 X-RAY EXAM CHEST 1 VIEW: CPT

## 2023-05-23 PROCEDURE — 83735 ASSAY OF MAGNESIUM: CPT

## 2023-05-23 PROCEDURE — 83690 ASSAY OF LIPASE: CPT

## 2023-05-23 PROCEDURE — 85025 COMPLETE CBC W/AUTO DIFF WBC: CPT

## 2023-05-23 RX ADMIN — SENNOSIDES SCH MG: 8.6 TABLET, FILM COATED ORAL at 20:36

## 2023-05-23 RX ADMIN — INSULIN ASPART SCH UNIT: 100 INJECTION, SOLUTION INTRAVENOUS; SUBCUTANEOUS at 20:35

## 2023-05-23 RX ADMIN — DOCUSATE SODIUM SCH MG: 100 CAPSULE ORAL at 20:36

## 2023-05-23 NOTE — ED CHEST PAIN
General


Chief Complaint:  Chest Pain


Stated Complaint:  CHEST PAINS | FATIGUE | NAUSEA


Nursing Triage Note:  


PT AMB TO [facility] 3 WITH CC OF L SIDE CHEST PAIN X "A COUPLE WEEKS." PT STATES PAIN 


INCREASED TODAY AND CALLED PCP THIS AM, WAS SENT TO ED. PT STATES CHEST PAIN 


RADIATES TO L SHOULDER AND IS REPORDUCABLE. DENIES CARDIAC HX AND SOA. PT A&OX4


Source:  patient


Exam Limitations:  no limitations





History of Present Illness


Date Seen by Provider:  May 23, 2023


Time Seen by Provider:  14:20


Initial Comments


Patient is a 42-year-old female presents ED left-sided chest pain.  Chest pain 

started 2 weeks ago.  Initially was mild.  Pain is described as sharp, dull at 

times.  Not worse with exertion.  She does feel chest pain while sitting.  She 

states that her chest wall feels tender on palpation.  Denies of any specific 

injury.  Denies of any associated shortness of breath, vomiting, diarrhea, 

recent travels or surgeries.  She reports some nausea.  Pain does radiate to the

left shoulder.  Denies history of similar type pain in the past.  Denies of any 

birth control, recent surgeries, leg pain.  She does report chronic edema in her

lower extremity without any recent swelling.  History of family cardiac history,

diabetes type 2, dyslipidemia.  Denies smoking.  Denies fever, chills, cough, 

headache, dizziness, sore throat





Allergies and Home Medications


Allergies


Coded Allergies:  


     No Known Drug Allergies (Unverified , 17)





Patient Home Medication List


Home Medication List Reviewed:  Yes


Docusate Sodium (Docusate Sodium) 100 Mg Capsule, 100 MG PO BID PRN for 

CONSTIPATION-1ST LINE


   Prescribed by: RENETTA GARCIA on 17 0854


Hydrocodone Bit/Acetaminophen (Lortab  7.5 Mg Tablet) 1 Each Tablet, 1-2 EA PO 

Q6H PRN for Pain-See Instructions


   Prescribed by: RENETTA GARCIA on 17 0854


Ibuprofen (Ibuprofen) 600 Mg Tablet, 600 MG PO Q6H PRN for PAIN-MODERATE


   Prescribed by: RENETTA GARCIA on 17 0854


Prenatal Vit W-Ca,Fe,FA(<1 mg) (Prenatal Vitamins) 1 Each Tablet, 1 EACH PO 

DAILY


   Prescribed by: RACHEL DÍAZ on 17 0316


Simethicone (Simethicone) 80 Mg Tab.chew, 40 MG PO TID PRN for INDIGESTION


   Prescribed by: RENETTA GARCIA on 17 0854





Review of Systems


Review of Systems


Constitutional:  No chills, No diaphoresis


EENTM:  No Double Vision, No Eye Pain


Respiratory:  Denies Cough


Cardiovascular:  Chest Pain


Gastrointestinal:  Denies Abdominal Pain, Denies Diarrhea; Nausea; Denies 

Vomiting


Genitourinary:  Denies Burning, Denies Discharge, Denies Drainage, Denies 

Frequency


Musculoskeletal:  No back pain, No joint pain


Skin:  No change in color, No change in hair/nails





All Other Systems Reviewed


Negative Unless Noted:  Yes





Past Medical-Social-Family Hx


Patient Social History


Tobacco Use?:  No


Substance use?:  No


Alcohol Use?:  Yes


Alcohol Frequency:  Once in a while


Pt feels they are or have been:  No





Immunizations Up To Date


Tetanus Booster (TDap):  Unknown


Influenza Vaccine Up-to-Date:  No; Not Current





Seasonal Allergies


Seasonal Allergies:  Yes





Past Medical History


Surgery/Hospitalization HX:  


HYST, DM,


Surgeries:  Yes (C/S x4, WISDOM TEETH, D&C)


 Section, Tubal Ligation


Respiratory:  Yes


Sleep Apnea


Currently Using CPAP:  Yes


Cardiac:  No


Neurological:  Yes


Headaches /Migraines


Reproductive Disorders:  Yes (AUB, DUB)


Female Reproductive Disorders:  Menstrual Problems


Sexually Transmitted Disease:  No


HIV/AIDS:  No


Gastrointestinal:  No


Chronic Constipation


Musculoskeletal:  Yes (HIP JOINT PAIN)


Endocrine:  No


Loss of Vision:  Denies


Hearing Impairment:  Denies


Cancer:  No


Psychosocial:  No


Anxiety


Integumentary:  No


Blood Disorders:  Yes (MILD ANEMIA)


Adverse Reaction/Blood Tranf:  No





Family Medical History


No Pertinent Family Hx





Physical Exam


Vital Signs





Vital Signs - First Documented








 23





 14:13


 


Pulse 93


 


Resp 26


 


B/P (MAP) 147/105 (119)


 


Pulse Ox 95


 


O2 Delivery Room Air





Capillary Refill : Less Than 3 Seconds


Height, Weight, BMI


Height: 5'6.00"


Weight: 386lbs. 3.0oz. [id number]; 62.3 BMI


Method:Stated


General Appearance:  No Apparent Distress, WD/WN


HEENT:  PERRL/EOMI, TMs Normal, Normal ENT Inspection, Pharynx Normal


Neck:  Full Range of Motion, Normal Inspection, Non Tender, Supple


Respiratory:  Chest Non Tender, Normal Breath Sounds, No Accessory Muscle Use, 

No Respiratory Distress, Other (Left-sided chest wall tenderness)


Cardiovascular:  Regular Rate, Rhythm, No Edema, No Gallop, No JVD


Gastrointestinal:  Normal Bowel Sounds, No Organomegaly, No Pulsatile Mass, Non 

Tender


Extremity:  Normal Capillary Refill, Normal Inspection, Normal Range of Motion


Neurologic/Psychiatric:  Alert, Oriented x3, No Motor/Sensory Deficits, Normal 

Mood/Affect, CNs II-XII Norm as Tested


Skin:  Normal Color, Warm/Dry





Progress/Results/Core Measures


Results/Orders


Lab Results





Laboratory Tests








Test


 23


14:25 Range/Units


 


 


White Blood Count


 8.2 


 4.3-11.0


10^3/uL


 


Red Blood Count


 5.55 H


 3.80-5.11


10^6/uL


 


Hemoglobin 14.8  11.5-16.0  g/dL


 


Hematocrit 45  35-52  %


 


Mean Corpuscular Volume 81  80-99  fL


 


Mean Corpuscular Hemoglobin 27  25-34  pg


 


Mean Corpuscular Hemoglobin


Concent 33 


 32-36  g/dL





 


Red Cell Distribution Width 14.0  10.0-14.5  %


 


Platelet Count


 282 


 130-400


10^3/uL


 


Mean Platelet Volume 11.4  9.0-12.2  fL


 


Immature Granulocyte % (Auto) 0   %


 


Neutrophils (%) (Auto) 72  42-75  %


 


Lymphocytes (%) (Auto) 19  12-44  %


 


Monocytes (%) (Auto) 5  0-12  %


 


Eosinophils (%) (Auto) 3  0-10  %


 


Basophils (%) (Auto) 1  0-10  %


 


Neutrophils # (Auto)


 5.9 


 1.8-7.8


10^3/uL


 


Lymphocytes # (Auto)


 1.6 


 1.0-4.0


10^3/uL


 


Monocytes # (Auto)


 0.4 


 0.0-1.0


10^3/uL


 


Eosinophils # (Auto)


 0.2 


 0.0-0.3


10^3/uL


 


Basophils # (Auto)


 0.1 


 0.0-0.1


10^3/uL


 


Immature Granulocyte # (Auto)


 0.0 


 0.0-0.1


10^3/uL


 


Prothrombin Time 13.6  12.2-14.7  SEC


 


INR Comment 1.0  0.8-1.4  


 


Activated Partial


Thromboplast Time 28 


 24-35  SEC





 


D-Dimer


 0.27 


 0.00-0.49


UG/ML


 


Sodium Level 138  135-145  MMOL/L


 


Potassium Level 4.0  3.6-5.0  MMOL/L


 


Chloride Level 104    MMOL/L


 


Carbon Dioxide Level 24  21-32  MMOL/L


 


Anion Gap 10  5-14  MMOL/L


 


Blood Urea Nitrogen 10  7-18  MG/DL


 


Creatinine


 0.74 


 0.60-1.30


MG/DL


 


Estimat Glomerular Filtration


Rate 103 


  





 


BUN/Creatinine Ratio 14   


 


Glucose Level 129 H   MG/DL


 


Calcium Level 9.6  8.5-10.1  MG/DL


 


Corrected Calcium 9.5  8.5-10.1  MG/DL


 


Magnesium Level 2.0  1.6-2.4  MG/DL


 


Total Bilirubin 1.3 H 0.1-1.0  MG/DL


 


Aspartate Amino Transf


(AST/SGOT) 24 


 5-34  U/L





 


Alanine Aminotransferase


(ALT/SGPT) 28 


 0-55  U/L





 


Alkaline Phosphatase 112    U/L


 


Myoglobin


 24.8 


 10.0-92.0


NG/ML


 


Troponin I < 0.028  <0.028  NG/ML


 


B-Type Natriuretic Peptide < 10.0  <100.0  PG/ML


 


Total Protein 7.7  6.4-8.2  GM/DL


 


Albumin 4.1  3.2-4.5  GM/DL


 


Lipase 20  8-78  U/L








My Orders





Orders - GORDY SALAZAR PA


Ekg Tracing (23 14:11)


Cbc With Automated Diff (23 14:18)


Magnesium (23 14:18)


Chest 1 View, Ap/Pa Only (23 14:18)


Ekg Tracing (23 14:18)


Comprehensive Metabolic Panel (23 14:18)


Myoglobin Serum (23 14:18)


Protime With Inr (23 14:18)


Partial Thromboplastin Time (23 14:18)


Monitor-Rhythm Ecg Trace Only (23 14:18)


Ed Iv/Invasive Line Start (23 14:18)


Lipase (23 14:18)


Bnp Chris (23 14:18)


Troponin I [place] (23 14:18)


Aspirin Chewable Tablet (Baby Aspirin Ch (23 14:30)


Morphine  Injection (Morphine  Injection (23 14:18)


Fibrin Degradation Products (23 15:04)


Nitroglycerin 0.4 Mg Btl 25's (Nitrostat (23 15:30)


Enoxaparin Injection (Lovenox Injection) (23 15:45)


Ed Admission (Communication) (23 15:54)





Medications Given in ED





Current Medications








 Medications  Dose


 Ordered  Sig/Vernell


 Route  Start Time


 Stop Time Status Last Admin


Dose Admin


 


 Aspirin  324 mg  ONCE  ONCE


 PO  23 14:30


 23 14:31 DC 23 14:28


324 MG


 


 Enoxaparin Sodium  100 mg  ONCE  ONCE


 SC  23 15:45


 23 15:46 DC 23 15:45


100 MG


 


 Nitroglycerin  0.4 mg  UD  PRN


 SL  23 15:30


    23 15:27


0.4 MG








Vital Signs/I&O











 23





 14:13


 


Pulse 93


 


Resp 26


 


B/P (MAP) 147/105 (119)


 


Pulse Ox 95


 


O2 Delivery Room Air














Blood Pressure Mean:                    119








Comment


Sinus rhythm, ST deviation, moderate T wave abnormality in the anterior leads V2

V3 V4,.  No ST elevation.  85 bpm, QRS duration 89 MS, QTc 411 MS.





Departure


Communication (Admissions)


Time/Spoke to Admitting Phy:  15:43


Consulted Dr. Buck cardiologist.  Recommends aspirin 324 mg 1 dose, Lovenox 1 

mg/kg 1 dose, n.p.o. after midnight.  Serial troponins.





Communication (PCP)


Reviewed previous ER visits, H&P, lab testing.  Patient with left-sided chest 

pain for the past 2 weeks.  Increasing pain over the past few days.  History of 

diabetes, family cardiac history.  No recent travels or surgeries.  Differential

diagnosis ACS, PE, pneumonia, pleurisy, pericarditis.  Heart score 3.  EKG 

showed ST deviation and moderate T wave abnormality in V2 to V4.  She received 

325 mg aspirin.  Initially received morphine with improvement of chest pain from

6-4.  She received sublingual nitro with resolution of pain.  Chest x-ray was 

unremarkable for pneumonia, pneumothorax.  CBC, CMP cardiac work-up was 

unremarkable.  Normal troponin, BMP, D-dimer.  Patient was discussed with Dr. Buck.  Recommend 1 dose of Lovenox 1 mg/kg, 324 mg aspirin, troponins.  If p

ositive troponin patient will go to cath temitope.  Negative troponin and patient 

will have a stress test tomorrow.  Patient agrees with admission.  Consulted Dr. Fernandez hospitalist who agrees to accept patient.  Pain appears to be 

reproducible however due to the EKG changes and improvement of pain after nitro 

recommend admission at this time.  Observation at this time.





Impression





   Primary Impression:  


   Chest pain


Disposition:   ADMITTED AS INPATIENT


Condition:  Stable





Admissions


Decision to Admit Reason:  Admit from ER (General)


Decision to Admit/Date:  May 23, 2023


Time/Decision to Admit Time:  15:43





Departure-Patient Inst.


Referrals:  


ADILIA FREY (PCP/Family)


Primary Care Physician











GORDY SALAZAR          May 23, 2023 14:22 

## 2023-05-23 NOTE — DIAGNOSTIC IMAGING REPORT
INDICATION: Chest pain.  



TECHNIQUE: Single-view chest at 02:43 p.m.



CORRELATION STUDY: 10/26/2017.



FINDINGS:

Heart size, mediastinum, and vasculature overall appear to be

enlarged and slightly more prominent from prior. Some of this may

be accentuated by technique.

Unchanged elevated right diaphragm. Lungs are otherwise clear.



IMPRESSION:

1. Heart size, mediastinum, and vasculature overall appear

slightly more prominent from prior. Some of this may be

attributed to differences in technique, the possibility of mild

edema not excluded.



Dictated by: 



  Dictated on workstation # [id number]

## 2023-05-24 VITALS — SYSTOLIC BLOOD PRESSURE: 110 MMHG | DIASTOLIC BLOOD PRESSURE: 64 MMHG

## 2023-05-24 VITALS — SYSTOLIC BLOOD PRESSURE: 126 MMHG | DIASTOLIC BLOOD PRESSURE: 74 MMHG

## 2023-05-24 VITALS — DIASTOLIC BLOOD PRESSURE: 76 MMHG | SYSTOLIC BLOOD PRESSURE: 113 MMHG

## 2023-05-24 VITALS — SYSTOLIC BLOOD PRESSURE: 140 MMHG | DIASTOLIC BLOOD PRESSURE: 88 MMHG

## 2023-05-24 VITALS — SYSTOLIC BLOOD PRESSURE: 149 MMHG | DIASTOLIC BLOOD PRESSURE: 87 MMHG

## 2023-05-24 VITALS — DIASTOLIC BLOOD PRESSURE: 71 MMHG | SYSTOLIC BLOOD PRESSURE: 127 MMHG

## 2023-05-24 VITALS — DIASTOLIC BLOOD PRESSURE: 60 MMHG | SYSTOLIC BLOOD PRESSURE: 100 MMHG

## 2023-05-24 LAB
BASOPHILS # BLD AUTO: 0.1 10^3/UL (ref 0–0.1)
BASOPHILS NFR BLD AUTO: 1 % (ref 0–10)
BUN/CREAT SERPL: 13
CALCIUM SERPL-MCNC: 8.8 MG/DL (ref 8.5–10.1)
CHLORIDE SERPL-SCNC: 105 MMOL/L (ref 98–107)
CHOLEST SERPL-MCNC: 134 MG/DL (ref ?–200)
CO2 SERPL-SCNC: 23 MMOL/L (ref 21–32)
CREAT SERPL-MCNC: 0.75 MG/DL (ref 0.6–1.3)
EOSINOPHIL # BLD AUTO: 0.2 10^3/UL (ref 0–0.3)
EOSINOPHIL NFR BLD AUTO: 2 % (ref 0–10)
GFR SERPLBLD BASED ON 1.73 SQ M-ARVRAT: 101 ML/MIN
GLUCOSE SERPL-MCNC: 127 MG/DL (ref 70–105)
HCT VFR BLD CALC: 39 % (ref 35–52)
HDLC SERPL-MCNC: 31 MG/DL (ref 40–60)
HGB BLD-MCNC: 13 G/DL (ref 11.5–16)
LYMPHOCYTES # BLD AUTO: 1.8 10^3/UL (ref 1–4)
LYMPHOCYTES NFR BLD AUTO: 18 % (ref 12–44)
MAGNESIUM SERPL-MCNC: 1.9 MG/DL (ref 1.6–2.4)
MANUAL DIFFERENTIAL PERFORMED BLD QL: NO
MCH RBC QN AUTO: 27 PG (ref 25–34)
MCHC RBC AUTO-ENTMCNC: 33 G/DL (ref 32–36)
MCV RBC AUTO: 81 FL (ref 80–99)
MONOCYTES # BLD AUTO: 0.7 10^3/UL (ref 0–1)
MONOCYTES NFR BLD AUTO: 6 % (ref 0–12)
NEUTROPHILS # BLD AUTO: 7.6 10^3/UL (ref 1.8–7.8)
NEUTROPHILS NFR BLD AUTO: 73 % (ref 42–75)
PLATELET # BLD: 253 10^3/UL (ref 130–400)
PMV BLD AUTO: 11.1 FL (ref 9–12.2)
POTASSIUM SERPL-SCNC: 3.6 MMOL/L (ref 3.6–5)
SODIUM SERPL-SCNC: 139 MMOL/L (ref 135–145)
TRIGL SERPL-MCNC: 142 MG/DL (ref ?–150)
VLDLC SERPL CALC-MCNC: 28 MG/DL (ref 5–40)
WBC # BLD AUTO: 10.4 10^3/UL (ref 4.3–11)

## 2023-05-24 RX ADMIN — POTASSIUM CHLORIDE SCH MLS/HR: 200 INJECTION, SOLUTION INTRAVENOUS at 07:53

## 2023-05-24 RX ADMIN — POTASSIUM CHLORIDE SCH MLS/HR: 200 INJECTION, SOLUTION INTRAVENOUS at 09:23

## 2023-05-24 RX ADMIN — MAGNESIUM SULFATE IN DEXTROSE SCH MLS/HR: 10 INJECTION, SOLUTION INTRAVENOUS at 05:58

## 2023-05-24 RX ADMIN — INSULIN ASPART SCH UNIT: 100 INJECTION, SOLUTION INTRAVENOUS; SUBCUTANEOUS at 05:37

## 2023-05-24 RX ADMIN — SENNOSIDES SCH MG: 8.6 TABLET, FILM COATED ORAL at 07:59

## 2023-05-24 RX ADMIN — POTASSIUM CHLORIDE SCH MLS/HR: 200 INJECTION, SOLUTION INTRAVENOUS at 05:57

## 2023-05-24 RX ADMIN — INSULIN ASPART SCH UNIT: 100 INJECTION, SOLUTION INTRAVENOUS; SUBCUTANEOUS at 10:53

## 2023-05-24 RX ADMIN — MAGNESIUM SULFATE IN DEXTROSE SCH MLS/HR: 10 INJECTION, SOLUTION INTRAVENOUS at 06:47

## 2023-05-24 RX ADMIN — DOCUSATE SODIUM SCH MG: 100 CAPSULE ORAL at 07:54

## 2023-05-24 RX ADMIN — POTASSIUM CHLORIDE SCH MLS/HR: 200 INJECTION, SOLUTION INTRAVENOUS at 06:48

## 2023-05-24 NOTE — HISTORY & PHYSICAL-HOSPITALIST
[facility]


History of Present Illness


HPI/Chief Complaint


Elina Griffith is a 43yo F with PMH of NAS, migraines, T2DM, obesity who pres

ented to the ED  with left-sided chest pain. Noted pain started 2 weeks ago 

as pressure in the left upper chest/clavicle region and continued to worsen with

increase in the past 2 days. Some radiation to left arm. Denies any injury to 

the area. Does note 2 days ago she had some nausea and headache but attributed 

to possible migraine given history. Patient never had pain like this before. 

Pain improved with NTG in ED.


Source:  patient


Exam Limitations:  no limitations


Date Seen


23


Time Seen by a Provider:  08:15


Attending Physician


Marianna Jenkins


PCP


Admitting Physician:


Bryon Barahona MD 








Attending Physician:


Bryon Barahona MD


Referring Physician





Date of Admission


May 23, 2023 at 16:34





Home Medications & Allergies


Home Medications


Reviewed patient Home Medication Reconciliation performed by pharmacy medication

reconciliations technician and/or nursing.


Patients Allergies have been reviewed.





Allergies





Allergies


Coded Allergies


  No Known Drug Allergies (Unverified17)








Past Medical-Social-Family Hx


Patient Social History


Marrital Status:  


Employed/Student:  employed


Tobacco Use?:  No


Use of E-Cig and/or Vaping dev:  No


Substance use?:  No


Substance frequency:  Rarely


Alcohol Use?:  Yes


Alcohol Frequency:  Once in a while


Pt feels they are or have been:  No





Immunizations Up To Date


Tetanus Booster (TDap):  Unknown





Seasonal Allergies


Seasonal Allergies:  Yes





Current Status


Pregnancy status:  No


Advance Directives:  No


Primary Language:  English


Preferred Spoken Language:  English


Implanted or Applied Medical D:  CPAP





Past Medical History


Surgeries:   Section, Hysterectomy


Sleep Apnea


Currently Using CPAP:  Yes


Headaches /Migraines


Sexually Transmitted Disease:  No


HIV/AIDS:  No


Chronic Constipation


Diabetes, Non-Insulin dep


Loss of Vision:  Denies


Hearing Impairment:  Denies


Anxiety, Depression


Adverse Reaction/Blood Tranf:  No





Family Medical History


Cancer (father-esophageal), CAD Over 55 Years Old (mother)





Review of Systems


Constitutional:  No dizziness, No fever


EENTM:  No blurred vision, No double vision


Respiratory:  No cough, No short of breath


Cardiovascular:  chest pain (left); No palpitations


Gastrointestinal:  No abdominal pain, No vomiting


Genitourinary:  No dysuria, No hematuria


Skin:  No change in color


Psychiatric/Neurological:  Anxiety, Depressed; Denies Headache


All Other Systems Reviewed


Negative Unless Noted:  Yes (Negative excepted noted.)





Physical Exam


Physical Exam


Vital Signs





Vital Signs - First Documented








 23





 14:13 19:00


 


Temp  36.2


 


Pulse 93 


 


Resp 26 


 


B/P (MAP) 147/105 (119) 


 


Pulse Ox 95 


 


O2 Delivery Room Air 





Capillary Refill : Less Than 3 Seconds


Height, Weight, BMI


Height: 5'6.00"


Weight: 386lbs. 3.0oz. 175.599970vk; 65.56 BMI


Method:Stated


General Appearance:  No Apparent Distress, Obese


HEENT:  PERRL/EOMI, Pharynx Normal, Moist Mucous Membranes


Neck:  Normal Inspection, Non Tender


Respiratory:  Lungs Clear, Normal Breath Sounds, No Respiratory Distress


Cardiovascular:  Regular Rate, Rhythm, No Murmur, Normal Peripheral Pulses, 

Other (left upper chest TTP)


Gastrointestinal:  Soft, Tenderness (LLQ)


Extremity:  Non Tender, No Pedal Edema


Neurologic/Psychiatric:  Alert, Oriented x3, Normal Mood/Affect


Skin:  Normal Color, Warm/Dry





Results


Results/Procedures


Labs


Laboratory Tests


23 14:25








23 04:40








Patient resulted labs reviewed.


Imaging:  Reviewed Imaging Films, Reviewed Imaging Report





Assessment/Plan


Admission Diagnosis


Atypical chest pain


Admission Status:  Observation





Assessment and Plan


Atypical chest pain


   Tropinins negative, BNP negative, d-dimer negative


   EKG with ST deviation and T wave abnormalities


   Cardiology following


   Nuclear stress test with normal left ventricular size, ejection fraction 58%


Abdominal discomfort


   Gallbladder u/s unremarkable


DM


Hyperglycemia


   Continue to monitor


   [facility]





Clinical Quality Measures


AMI/AHF:


ASA po Prior to arrival:  BRYON Denise MD 23 1813:


History of Present Illness


Time Seen by a Provider:  13:10





Assessment/Plan


Admission Diagnosis


Admission Status:  Observation





Assessment and Plan


Elina Griffith is a 42 year old female admitted with chest pain. Cardiology was 

consulted and assisted with her care. Her troponin remained normal. Due to risk 

factors, she underwent a stress test which was also negative. She reported 

symptoms consistent with GERD. She takes Tums as needed. She also had interm

ittently elevated blood pressure readings. She also had hyperglycemia. An A1C 

was pending at the time of discharge. She was encouraged to follow up with her 

PCP. She was discharged home in stable condition.





Diagnosis/Problems


Diagnosis/Problems





(1) Chest pain


Status:  Acute


(2) GERD (gastroesophageal reflux disease)


Status:  Acute


(3) Morbid obesity


Status:  Chronic


(4) Elevated blood pressure reading


Status:  Acute


(5) Hyperglycemia


Status:  Acute





Supervisory-Addendum Brief


Verification & Attestation


Participated in pt care:  history, MDM, physical


Personally performed:  exam, history, MDM, supervision of care


Care discussed with:  Medical Student


Procedures:  n/a


A medical student performed and documented this service in my presence. I 

reviewed and verified all information documented by the medical student and made

modifications to such information, when appropriate. I personally performed the 

physical exam and medical decision making.











JOSHUA RODRÍGUEZ               May 24, 2023 14:29


BRYON BARAHONA MD              May 24, 2023 18:13

## 2023-05-24 NOTE — CONSULTATION-CARDIOLOGY
HPI-Cardiology


Cardiology Consultation


Date of Consultation


5/24/23


Date of Admission





Time Seen by Provider:  09:51


Indication:  Chest pain





HPI


42-year-old lady with history of hyperlipidemia.  Reported episode of chest pain

in the left upper side of her chest radiating to the back.  For the past 2 days 

waxing and waning.  Became worse yesterday.  Came into the emergency room for 

evaluation she was admitted.  On my evaluation she was laying down comfortably 

in bed.  Reported some nausea and diaphoresis.  Mild shortness of breath.  No 

palpitation.  No syncope.





Home Medications & Allergies


Allergies:  


Coded Allergies:  


     No Known Drug Allergies (Unverified , 7/13/17)


Home Medication List Reviewed:  Yes





PMH-Social-Family Hx


Patient Social History


Marital Status:  


Employed/Student:  employed


Recent Hopitalizations:  No


Have you traveled recently?:  No


Alcohol Use?:  Yes


Substance type:  Caffeine





Immunizations Up To Date


Tetanus Booster (TDap):  Unknown





Past Medical History


Discussed below





Family Medical History


Significant Family History:  No Pertinent Family Hx





Review of Systems-General


Review of Systems


Constitutional:  No chills, No diaphoresis


EENTM:  see HPI, no symptoms reported


Respiratory:  no symptoms reported, see HPI, short of breath


Cardiovascular:  see HPI, chest pain; No edema, No Hx of Intervention, No 

palpitations, No syncope, No vascular heart diseas, No other


Gastrointestinal:  no symptoms reported, see HPI, nausea


Genitourinary:  no symptoms reported, see HPI


Musculoskeletal:  No back pain, No joint pain


Skin:  No change in color, No change in hair/nails


Psychiatric/Neurological:  No Symptoms Reported, See HPI





All Other Systems Reviewed


Negative Unless Noted:  Yes





Reviewed Test Results


Reviewed Test Results


Lab





Laboratory Tests








Test


 5/23/23


14:25 5/23/23


20:35 5/24/23


04:40 Range/Units


 


 


White Blood Count


 8.2 


 


 10.4 


 4.3-11.0


10^3/uL


 


Red Blood Count


 5.55 H


 


 4.84 


 3.80-5.11


10^6/uL


 


Hemoglobin 14.8   13.0  11.5-16.0  g/dL


 


Hematocrit 45   39  35-52  %


 


Mean Corpuscular Volume 81   81  80-99  fL


 


Mean Corpuscular Hemoglobin 27   27  25-34  pg


 


Mean Corpuscular Hemoglobin


Concent 33 


 


 33 


 32-36  g/dL





 


Red Cell Distribution Width 14.0   14.0  10.0-14.5  %


 


Platelet Count


 282 


 


 253 


 130-400


10^3/uL


 


Mean Platelet Volume 11.4   11.1  9.0-12.2  fL


 


Immature Granulocyte % (Auto) 0   0   %


 


Neutrophils (%) (Auto) 72   73  42-75  %


 


Lymphocytes (%) (Auto) 19   18  12-44  %


 


Monocytes (%) (Auto) 5   6  0-12  %


 


Eosinophils (%) (Auto) 3   2  0-10  %


 


Basophils (%) (Auto) 1   1  0-10  %


 


Neutrophils # (Auto)


 5.9 


 


 7.6 


 1.8-7.8


10^3/uL


 


Lymphocytes # (Auto)


 1.6 


 


 1.8 


 1.0-4.0


10^3/uL


 


Monocytes # (Auto)


 0.4 


 


 0.7 


 0.0-1.0


10^3/uL


 


Eosinophils # (Auto)


 0.2 


 


 0.2 


 0.0-0.3


10^3/uL


 


Basophils # (Auto)


 0.1 


 


 0.1 


 0.0-0.1


10^3/uL


 


Immature Granulocyte # (Auto)


 0.0 


 


 0.0 


 0.0-0.1


10^3/uL


 


Prothrombin Time 13.6    12.2-14.7  SEC


 


INR Comment 1.0    0.8-1.4  


 


Activated Partial


Thromboplast Time 28 


 


 


 24-35  SEC





 


D-Dimer


 0.27 


 


 


 0.00-0.49


UG/ML


 


Sodium Level 138   139  135-145  MMOL/L


 


Potassium Level 4.0   3.6  3.6-5.0  MMOL/L


 


Chloride Level 104   105    MMOL/L


 


Carbon Dioxide Level 24   23  21-32  MMOL/L


 


Anion Gap 10   11  5-14  MMOL/L


 


Blood Urea Nitrogen 10   10  7-18  MG/DL


 


Creatinine


 0.74 


 


 0.75 


 0.60-1.30


MG/DL


 


Estimat Glomerular Filtration


Rate 103 


 


 101 


  





 


BUN/Creatinine Ratio 14   13   


 


Glucose Level 129 H  127 H   MG/DL


 


Calcium Level 9.6   8.8  8.5-10.1  MG/DL


 


Corrected Calcium 9.5    8.5-10.1  MG/DL


 


Magnesium Level 2.0   1.9  1.6-2.4  MG/DL


 


Total Bilirubin 1.3 H   0.1-1.0  MG/DL


 


Aspartate Amino Transf


(AST/SGOT) 24 


 


 


 5-34  U/L





 


Alanine Aminotransferase


(ALT/SGPT) 28 


 


 


 0-55  U/L





 


Alkaline Phosphatase 112      U/L


 


Myoglobin


 24.8 


 


 


 10.0-92.0


NG/ML


 


Troponin I < 0.028   < 0.028  <0.028  NG/ML


 


B-Type Natriuretic Peptide < 10.0    <100.0  PG/ML


 


Total Protein 7.7    6.4-8.2  GM/DL


 


Albumin 4.1    3.2-4.5  GM/DL


 


Lipase 20    8-78  U/L


 


Glucometer  126 H    MG/DL


 


Triglycerides Level   142  <150  MG/DL


 


Cholesterol Level   134  < 200  MG/DL


 


LDL Cholesterol Direct   92  1-129  MG/DL


 


VLDL Cholesterol   28  5-40  MG/DL


 


HDL Cholesterol   31 L 40-60  MG/DL











Physical Exam


Physical Exam


Vital Signs





Vital Signs - First Documented








 5/23/23 5/23/23





 14:13 19:00


 


Temp  36.2


 


Pulse 93 


 


Resp 26 


 


B/P (MAP) 147/105 (119) 


 


Pulse Ox 95 


 


O2 Delivery Room Air 





Capillary Refill : Less Than 3 Seconds


Height, Weight, BMI


Height: 5'6.00"


Weight: 386lbs. 3.0oz. [id number]; 65.56 BMI


Method:Stated


General Appearance:  No Apparent Distress, WD/WN


HEENT:  PERRL/EOMI, TMs Normal, Normal ENT Inspection, Pharynx Normal


Neck:  Full Range of Motion, Normal Inspection, Non Tender, Supple


Respiratory:  Chest Non Tender, Normal Breath Sounds, No Accessory Muscle Use, 

No Respiratory Distress, Other (Left-sided chest wall tenderness)


Cardiovascular:  Regular Rate, Rhythm, No Edema, No Gallop, No JVD


Gastrointestinal:  Normal Bowel Sounds, No Organomegaly, No Pulsatile Mass, 

Other (Right upper quadrant discomfort)


Extremity:  Normal Capillary Refill, Normal Inspection, Normal Range of Motion


Neurologic/Psychiatric:  Alert, Oriented x3, No Motor/Sensory Deficits, Normal 

Mood/Affect, CNs II-XII Norm as Tested


Skin:  Normal Color, Warm/Dry





A/P-Cardiology


Admission Diagnosis


Chest pain


Hypertension


Diabetes mellitus


Right upper quadrant pain





Assessment/Plan


Chest pain, nonspecific etiology, atypical in presentation


Her pain is mainly at rest, not related to exertion


No acute EKG changes, cardiac enzymes were negative


I will evaluate exercise stress test and 2D echo





Right upper quadrant tenderness, I will evaluate gallbladder ultrasound





Hyperglycemia, diabetes.  Managed by primary care physician





Obesity, BMI 65.





Clinical Quality Measures


AMI/AHF:


ASA po Prior to arrival:  No











ANTOINE SCHWARTZ MD              May 24, 2023 09:54

## 2023-05-24 NOTE — DIAGNOSTIC IMAGING REPORT
PROCEDURE: US Gallbladder.



INDICATION:  Right upper quadrant abdominal pain



TECHNIQUE: Multiple grayscale sonographic images were obtained of

the right upper quadrant of the abdomen. 



CORRELATION STUDY:  None



FINDINGS: 

LIVER:  Liver enlarged at nearly 20 cm along with increased

echogenicity suggesting steatosis. No focal lesion.  The main

portal vein is patent and with normal direction of flow.



GALLBLADDER:  The gallbladder demonstrates no definitive

shadowing gallstones, abnormal gallbladder wall thickening or

pericholecystic fluid. 

COMMON BILE DUCT:  Upper limits of normal, 0.6 cm.  



PANCREAS:  Obscured by bowel gas.

AORTA/IVC:  Abdominal aorta largely obscured by overlying bowel

gas. Visualized portions of the inferior vena cava, unremarkable.

RIGHT KIDNEY:  12.7 x 5.7 x 5 cm.  No hydronephrosis. 



OTHER:   Examination is compromised by patient's reported body

habitus and overlying bowel gas.





IMPRESSION: 

1. Hepatomegaly with steatosis.

2. Negative for gallstones. Common bile duct is borderline

dilated, nonspecific.



Dictated by: 



  Dictated on workstation # [id number]

## 2023-05-24 NOTE — CARDIOLOGY STRESS TEST REPORT
Stress Test Report


Date of Procedure/Referring:


Date of Procedure:  May 24, 2023


PCP


Marianna Jenkins


Admitting Physician


Admitting Physician:


Guillermina Fernandez MD 








Attending Physician:


Guillermina Fernandez MD





Indications:


CP





Baseline Heart Rate:


76





Baseline Blood Pressure:


Blood Pressure Systolic:  140


Blood Pressure Diastolic:  88


Baseline Vitals





Vital Signs








  Date Time  Temp Pulse Resp B/P (MAP) Pulse Ox O2 Delivery O2 Flow Rate FiO2


 


5/23/23 14:13  93 26 147/105 (119) 95 Room Air  


 


5/23/23 19:00 36.2       











Baseline EKG:


Baseline EKG:  NSR





Summary


After explaining the procedure to the patient, she  signed a consent and then 

brought to the stress nuclear laboratory.


Patient received 0.4 mg Lexiscan for stress test, ECG, heart rate and blood 

pressure were monitored continuously.  Resting and stress dose of radio tracer 

were injected, imaging was acquired and reviewed in short axis, horizontal long 

axis and vertical long axis views.


TID:  1.08


SSS:  3


SDS:  3


EF:  58


Patient tolerated Lexiscan well


No ischemia or infarction noted on SPECT images


Normal left ventricular size, ejection fraction 58%











ANTOINE SCHWARTZ MD              May 24, 2023 13:32

## 2023-06-21 ENCOUNTER — HOSPITAL ENCOUNTER (OUTPATIENT)
Dept: HOSPITAL 75 - RAD | Age: 43
End: 2023-06-21
Attending: NURSE PRACTITIONER
Payer: SELF-PAY

## 2023-06-21 DIAGNOSIS — R07.89: ICD-10-CM

## 2023-06-21 DIAGNOSIS — I10: ICD-10-CM

## 2023-06-21 DIAGNOSIS — E11.9: Primary | ICD-10-CM

## 2023-06-21 DIAGNOSIS — E66.9: ICD-10-CM

## 2023-06-21 PROCEDURE — 75571 CT HRT W/O DYE W/CA TEST: CPT

## 2023-06-21 NOTE — DIAGNOSTIC IMAGING REPORT
Indication: Chest pain



CT coronary calcium study performed with noncontrast images of

the heart followed by calculation of cardiac score. Dose

reduction protocol was used.



Raw data images demonstrate no evidence of mediastinal

adenopathy. There is no  pleural fluid. The entirety of the lungs

are not included on the study but no discrete pulmonary lesion is

seen.



There is no significant coronary artery calcification. Coronary

calcium score was 0.



IMPRESSION:



CT coronary calcium score was 0.



Dictated by: 



  Dictated on workstation # [id number]